# Patient Record
Sex: MALE | Race: WHITE | NOT HISPANIC OR LATINO
[De-identification: names, ages, dates, MRNs, and addresses within clinical notes are randomized per-mention and may not be internally consistent; named-entity substitution may affect disease eponyms.]

---

## 2017-06-30 ENCOUNTER — APPOINTMENT (OUTPATIENT)
Dept: UROLOGY | Facility: CLINIC | Age: 77
End: 2017-06-30

## 2017-06-30 VITALS — DIASTOLIC BLOOD PRESSURE: 92 MMHG | RESPIRATION RATE: 16 BRPM | SYSTOLIC BLOOD PRESSURE: 148 MMHG | HEART RATE: 72 BPM

## 2017-07-10 LAB — PSA SERPL-MCNC: 4.97 NG/ML

## 2017-08-02 ENCOUNTER — APPOINTMENT (OUTPATIENT)
Dept: UROLOGY | Facility: CLINIC | Age: 77
End: 2017-08-02
Payer: MEDICARE

## 2017-08-02 VITALS — BODY MASS INDEX: 28.44 KG/M2 | HEIGHT: 72 IN | WEIGHT: 210 LBS

## 2017-08-02 DIAGNOSIS — N20.2 CALCULUS OF KIDNEY WITH CALCULUS OF URETER: ICD-10-CM

## 2017-08-02 PROCEDURE — 99213 OFFICE O/P EST LOW 20 MIN: CPT

## 2017-08-03 LAB — PSA SERPL-MCNC: 4.69 NG/ML

## 2018-02-02 ENCOUNTER — APPOINTMENT (OUTPATIENT)
Dept: UROLOGY | Facility: CLINIC | Age: 78
End: 2018-02-02
Payer: MEDICARE

## 2018-02-02 ENCOUNTER — OUTPATIENT (OUTPATIENT)
Dept: OUTPATIENT SERVICES | Facility: HOSPITAL | Age: 78
LOS: 1 days | End: 2018-02-02
Payer: MEDICARE

## 2018-02-02 PROCEDURE — 76775 US EXAM ABDO BACK WALL LIM: CPT | Mod: 26

## 2018-02-02 PROCEDURE — 99213 OFFICE O/P EST LOW 20 MIN: CPT | Mod: 25

## 2018-02-02 PROCEDURE — 76775 US EXAM ABDO BACK WALL LIM: CPT

## 2018-02-06 DIAGNOSIS — R97.20 ELEVATED PROSTATE SPECIFIC ANTIGEN [PSA]: ICD-10-CM

## 2018-02-06 DIAGNOSIS — N40.1 BENIGN PROSTATIC HYPERPLASIA WITH LOWER URINARY TRACT SYMPTOMS: ICD-10-CM

## 2018-02-08 LAB — PSA SERPL-MCNC: 6.33 NG/ML

## 2018-08-03 ENCOUNTER — APPOINTMENT (OUTPATIENT)
Dept: UROLOGY | Facility: CLINIC | Age: 78
End: 2018-08-03
Payer: MEDICARE

## 2018-08-03 ENCOUNTER — OTHER (OUTPATIENT)
Age: 78
End: 2018-08-03

## 2018-08-03 VITALS
WEIGHT: 211 LBS | HEART RATE: 65 BPM | SYSTOLIC BLOOD PRESSURE: 121 MMHG | BODY MASS INDEX: 28.62 KG/M2 | DIASTOLIC BLOOD PRESSURE: 70 MMHG | RESPIRATION RATE: 16 BRPM | TEMPERATURE: 97.6 F

## 2018-08-03 PROCEDURE — 99213 OFFICE O/P EST LOW 20 MIN: CPT

## 2018-08-06 LAB — PSA SERPL-MCNC: 6.32 NG/ML

## 2018-08-23 ENCOUNTER — FORM ENCOUNTER (OUTPATIENT)
Age: 78
End: 2018-08-23

## 2018-08-24 ENCOUNTER — APPOINTMENT (OUTPATIENT)
Dept: MRI IMAGING | Facility: IMAGING CENTER | Age: 78
End: 2018-08-24
Payer: MEDICARE

## 2018-08-24 ENCOUNTER — OUTPATIENT (OUTPATIENT)
Dept: OUTPATIENT SERVICES | Facility: HOSPITAL | Age: 78
LOS: 1 days | End: 2018-08-24
Payer: MEDICARE

## 2018-08-24 DIAGNOSIS — R97.20 ELEVATED PROSTATE SPECIFIC ANTIGEN [PSA]: ICD-10-CM

## 2018-08-24 PROCEDURE — A9585: CPT

## 2018-08-24 PROCEDURE — 72197 MRI PELVIS W/O & W/DYE: CPT

## 2018-08-24 PROCEDURE — 82565 ASSAY OF CREATININE: CPT

## 2018-08-24 PROCEDURE — 72197 MRI PELVIS W/O & W/DYE: CPT | Mod: 26

## 2018-08-30 ENCOUNTER — APPOINTMENT (OUTPATIENT)
Dept: ORTHOPEDIC SURGERY | Facility: CLINIC | Age: 78
End: 2018-08-30
Payer: COMMERCIAL

## 2018-08-30 VITALS — BODY MASS INDEX: 28.44 KG/M2 | HEIGHT: 72 IN | WEIGHT: 210 LBS

## 2018-08-30 VITALS — DIASTOLIC BLOOD PRESSURE: 70 MMHG | HEART RATE: 71 BPM | SYSTOLIC BLOOD PRESSURE: 135 MMHG

## 2018-08-30 DIAGNOSIS — Z86.39 PERSONAL HISTORY OF OTHER ENDOCRINE, NUTRITIONAL AND METABOLIC DISEASE: ICD-10-CM

## 2018-08-30 DIAGNOSIS — Z78.9 OTHER SPECIFIED HEALTH STATUS: ICD-10-CM

## 2018-08-30 DIAGNOSIS — M54.2 CERVICALGIA: ICD-10-CM

## 2018-08-30 PROCEDURE — 99204 OFFICE O/P NEW MOD 45 MIN: CPT

## 2018-08-30 PROCEDURE — 72040 X-RAY EXAM NECK SPINE 2-3 VW: CPT

## 2018-09-12 ENCOUNTER — APPOINTMENT (OUTPATIENT)
Dept: UROLOGY | Facility: CLINIC | Age: 78
End: 2018-09-12
Payer: MEDICARE

## 2018-09-12 PROCEDURE — 99213 OFFICE O/P EST LOW 20 MIN: CPT

## 2018-09-26 ENCOUNTER — OUTPATIENT (OUTPATIENT)
Dept: OUTPATIENT SERVICES | Facility: HOSPITAL | Age: 78
LOS: 1 days | End: 2018-09-26
Payer: MEDICARE

## 2018-09-26 ENCOUNTER — APPOINTMENT (OUTPATIENT)
Dept: UROLOGY | Facility: CLINIC | Age: 78
End: 2018-09-26
Payer: MEDICARE

## 2018-09-26 VITALS — SYSTOLIC BLOOD PRESSURE: 154 MMHG | RESPIRATION RATE: 16 BRPM | HEART RATE: 85 BPM | DIASTOLIC BLOOD PRESSURE: 84 MMHG

## 2018-09-26 DIAGNOSIS — R35.0 FREQUENCY OF MICTURITION: ICD-10-CM

## 2018-09-26 DIAGNOSIS — R97.20 ELEVATED PROSTATE, SPECIFIC ANTIGEN [PSA]: ICD-10-CM

## 2018-09-26 PROCEDURE — 76942 ECHO GUIDE FOR BIOPSY: CPT | Mod: 26,59

## 2018-09-26 PROCEDURE — 76872 US TRANSRECTAL: CPT | Mod: 26

## 2018-09-26 PROCEDURE — 55700: CPT

## 2018-09-26 PROCEDURE — 88305 TISSUE EXAM BY PATHOLOGIST: CPT | Mod: 26

## 2018-09-26 PROCEDURE — 76872 US TRANSRECTAL: CPT

## 2018-09-26 PROCEDURE — 76942 ECHO GUIDE FOR BIOPSY: CPT | Mod: 59

## 2018-09-29 DIAGNOSIS — R97.20 ELEVATED PROSTATE SPECIFIC ANTIGEN [PSA]: ICD-10-CM

## 2018-10-03 ENCOUNTER — APPOINTMENT (OUTPATIENT)
Dept: UROLOGY | Facility: CLINIC | Age: 78
End: 2018-10-03
Payer: MEDICARE

## 2018-10-03 PROCEDURE — 99213 OFFICE O/P EST LOW 20 MIN: CPT

## 2018-10-04 LAB — CORE LAB BIOPSY: NORMAL

## 2018-10-09 ENCOUNTER — OUTPATIENT (OUTPATIENT)
Dept: OUTPATIENT SERVICES | Facility: HOSPITAL | Age: 78
LOS: 1 days | Discharge: HOME | End: 2018-10-09
Payer: MEDICARE

## 2018-10-09 ENCOUNTER — APPOINTMENT (OUTPATIENT)
Dept: RADIATION ONCOLOGY | Facility: CLINIC | Age: 78
End: 2018-10-09
Payer: MEDICARE

## 2018-10-09 VITALS — HEIGHT: 72 IN | WEIGHT: 210 LBS | BODY MASS INDEX: 28.44 KG/M2 | RESPIRATION RATE: 16 BRPM

## 2018-10-09 DIAGNOSIS — Z87.891 PERSONAL HISTORY OF NICOTINE DEPENDENCE: ICD-10-CM

## 2018-10-09 PROCEDURE — 77263 THER RADIOLOGY TX PLNG CPLX: CPT

## 2018-10-09 PROCEDURE — 99205 OFFICE O/P NEW HI 60 MIN: CPT | Mod: 25

## 2018-10-09 RX ORDER — CIPROFLOXACIN HYDROCHLORIDE 500 MG/1
500 TABLET, FILM COATED ORAL TWICE DAILY
Qty: 6 | Refills: 0 | Status: DISCONTINUED | COMMUNITY
Start: 2018-09-12 | End: 2018-10-09

## 2018-10-17 ENCOUNTER — APPOINTMENT (OUTPATIENT)
Dept: UROLOGY | Facility: CLINIC | Age: 78
End: 2018-10-17

## 2018-10-19 ENCOUNTER — OUTPATIENT (OUTPATIENT)
Dept: OUTPATIENT SERVICES | Facility: HOSPITAL | Age: 78
LOS: 1 days | End: 2018-10-19
Payer: MEDICARE

## 2018-10-19 ENCOUNTER — APPOINTMENT (OUTPATIENT)
Dept: UROLOGY | Facility: CLINIC | Age: 78
End: 2018-10-19
Payer: MEDICARE

## 2018-10-19 DIAGNOSIS — R35.0 FREQUENCY OF MICTURITION: ICD-10-CM

## 2018-10-19 PROCEDURE — 99213 OFFICE O/P EST LOW 20 MIN: CPT

## 2018-10-19 PROCEDURE — 96402 CHEMO HORMON ANTINEOPL SQ/IM: CPT

## 2018-10-19 RX ORDER — LEUPROLIDE ACETATE 45 MG
45 KIT INTRAMUSCULAR
Qty: 1 | Refills: 0 | Status: DISCONTINUED | OUTPATIENT
Start: 2018-10-19 | End: 2018-10-19

## 2018-10-19 RX ORDER — LEUPROLIDE ACETATE 45 MG
45 KIT INTRAMUSCULAR
Qty: 1 | Refills: 0 | Status: COMPLETED | OUTPATIENT
Start: 2018-10-19

## 2018-10-19 RX ADMIN — LEUPROLIDE ACETATE 0 MG: KIT at 00:00

## 2018-10-24 DIAGNOSIS — C61 MALIGNANT NEOPLASM OF PROSTATE: ICD-10-CM

## 2018-10-25 PROCEDURE — 77301 RADIOTHERAPY DOSE PLAN IMRT: CPT | Mod: 26

## 2018-10-25 PROCEDURE — 77300 RADIATION THERAPY DOSE PLAN: CPT | Mod: 26

## 2018-10-25 PROCEDURE — 77338 DESIGN MLC DEVICE FOR IMRT: CPT | Mod: 26

## 2018-11-19 PROCEDURE — 77387B: CUSTOM | Mod: 26

## 2018-11-19 PROCEDURE — 77427 RADIATION TX MANAGEMENT X5: CPT

## 2018-11-20 PROCEDURE — 77387B: CUSTOM | Mod: 26

## 2018-11-21 PROCEDURE — 77387B: CUSTOM | Mod: 26

## 2018-11-23 ENCOUNTER — LABORATORY RESULT (OUTPATIENT)
Age: 78
End: 2018-11-23

## 2018-11-23 VITALS
HEART RATE: 64 BPM | HEIGHT: 72 IN | OXYGEN SATURATION: 99 % | RESPIRATION RATE: 16 BRPM | WEIGHT: 210 LBS | BODY MASS INDEX: 28.44 KG/M2 | SYSTOLIC BLOOD PRESSURE: 129 MMHG | DIASTOLIC BLOOD PRESSURE: 83 MMHG

## 2018-11-23 PROCEDURE — 77387B: CUSTOM | Mod: 26

## 2018-11-23 NOTE — REVIEW OF SYSTEMS
[Anal Pain: Grade 0] : Anal Pain: Grade 0 [Constipation: Grade 0] : Constipation: Grade 0 [Diarrhea: Grade 0] : Diarrhea: Grade 0 [Dyspepsia: Grade 0] : Dyspepsia: Grade 0 [Dysphagia: Grade 0] : Dysphagia: Grade 0 [Esophagitis: Grade 0] : Esophagitis: Grade 0 [Fecal Incontinence: Grade 0] : Fecal Incontinence: Grade 0 [Gastroparesis: Grade 0] : Gastroparesis: Grade 0 [Nausea: Grade 0] : Nausea: Grade 0 [Proctitis: Grade 0] : Proctitis: Grade 0 [Rectal Pain: Grade 0] : Rectal Pain: Grade 0 [Small Intestinal Obstruction: Grade 0] : Small Intestinal Obstruction: Grade 0 [Vomiting: Grade 0] : Vomiting: Grade 0 [Hematuria: Grade 0] : Hematuria: Grade 0 [Urinary Incontinence: Grade 0] : Urinary Incontinence: Grade 0  [Urinary Retention: Grade 0] : Urinary Retention: Grade 0 [Urinary Tract Pain: Grade 0] : Urinary Tract Pain: Grade 0 [Urinary Urgency: Grade 0] : Urinary Urgency: Grade 0 [Urinary Frequency: Grade 0] : Urinary Frequency: Grade 0

## 2018-11-25 LAB
APPEARANCE: CLEAR
BILIRUBIN URINE: NEGATIVE
BLOOD URINE: ABNORMAL
COLOR: YELLOW
GLUCOSE QUALITATIVE U: NEGATIVE MG/DL
KETONES URINE: NEGATIVE
LEUKOCYTE ESTERASE URINE: ABNORMAL
NITRITE URINE: NEGATIVE
PH URINE: 5
PROTEIN URINE: NEGATIVE MG/DL
SPECIFIC GRAVITY URINE: 1.01
UROBILINOGEN URINE: NEGATIVE MG/DL

## 2018-11-25 NOTE — HISTORY OF PRESENT ILLNESS
[FreeTextEntry1] : 76 y/o male with h/o DM2, newly diagnosed prostate cancer presents here for consideration of radiation treatment. IPSS score 10, EPIC 8. On Alfuzosin for urinary retention, frequency. \par \par Prostate biopsy in 9/2018 revealed [place] 4+3 in one core with 20% of tissue involved, [place] 3+4 in one core with 10% involved. Total 19 cores. MRI in 8/2018 showed prostate vol 99cc., score 5. PSA 6.32 in 8/2018.\par \par 3/28 fractions completed. c/o low abd pain, maybe blood in urine. Will check UA , then give antibiotics

## 2018-11-28 ENCOUNTER — APPOINTMENT (OUTPATIENT)
Dept: RADIATION ONCOLOGY | Facility: CLINIC | Age: 78
End: 2018-11-28
Payer: MEDICARE

## 2018-11-28 PROCEDURE — ZZZZZ: CPT

## 2018-11-28 RX ORDER — PHENAZOPYRIDINE 200 MG/1
200 TABLET, FILM COATED ORAL 3 TIMES DAILY
Qty: 30 | Refills: 1 | Status: ACTIVE | COMMUNITY
Start: 2018-11-28 | End: 1900-01-01

## 2018-11-28 NOTE — REVIEW OF SYSTEMS
[Anal Pain: Grade 1 - Mild pain] : Anal Pain: Grade 1 - Mild pain [Constipation: Grade 0] : Constipation: Grade 0 [Diarrhea: Grade 1 - Increase of <4 stools per day over baseline; mild increase in ostomy output compared to baseline] : Diarrhea: Grade 1 - Increase of <4 stools per day over baseline; mild increase in ostomy output compared to baseline [Dyspepsia: Grade 0] : Dyspepsia: Grade 0 [Dysphagia: Grade 0] : Dysphagia: Grade 0 [Esophagitis: Grade 0] : Esophagitis: Grade 0 [Fecal Incontinence: Grade 0] : Fecal Incontinence: Grade 0 [Gastroparesis: Grade 0] : Gastroparesis: Grade 0 [Nausea: Grade 0] : Nausea: Grade 0 [Proctitis: Grade 0] : Proctitis: Grade 0 [Rectal Pain: Grade 0] : Rectal Pain: Grade 0 [Small Intestinal Obstruction: Grade 0] : Small Intestinal Obstruction: Grade 0 [Vomiting: Grade 0] : Vomiting: Grade 0 [Hematuria: Grade 0] : Hematuria: Grade 0 [Urinary Incontinence: Grade 1 - Occasional (e.g., with coughing, sneezing, etc.), pads not indicated] : Urinary Incontinence: Grade 1 - Occasional (e.g., with coughing, sneezing, etc.), pads not indicated [Urinary Retention: Grade 1 - Urinary, suprapubic or intermittent catheter placement not indicated; able to void with some residual] : Urinary Retention: Grade 1 - Urinary, suprapubic or intermittent catheter placement not indicated; able to void with some residual [Urinary Tract Pain: Grade 2 - Moderate pain; limiting instrumental ADL] : Urinary Tract Pain: Grade 2 - Moderate pain; limiting instrumental ADL [Urinary Urgency: Grade 0] : Urinary Urgency: Grade 0 [Urinary Frequency: Grade 2 - Limiting instrumental ADL; medical management indicated] : Urinary Frequency: Grade 2 - Limiting instrumental ADL; medical management indicated [Erectile Dysfunction: Grade 3 - Decrease in erectile function (frequency/rigidity of erections) but erectile intervention not helpful (e.g., medication or mechanical devices such as penile pump); placement of a permanent penile prosthesis indicated (not p] : Erectile Dysfunction: Grade 3 - Decrease in erectile function (frequency/rigidity of erections) but erectile intervention not helpful (e.g., medication or mechanical devices such as penile pump); placement of a permanent penile prosthesis indicated (not previously present) [Ejaculation Disorder: Grade 2 - Anejaculation or retrograde ejaculation] : Ejaculation Disorder: Grade 2 - Anejaculation or retrograde ejaculation

## 2018-12-02 NOTE — HISTORY OF PRESENT ILLNESS
[FreeTextEntry1] : 76 y/o male with h/o DM2, newly diagnosed prostate cancer presents here for consideration of radiation treatment. IPSS score 10, EPIC 8. On Alfuzosin for urinary retention, frequency. \par \par Prostate biopsy in 9/2018 revealed [place] 4+3 in one core with 20% of tissue involved, [place] 3+4 in one core with 10% involved. Total 19 cores. MRI in 8/2018 showed prostate vol 99cc., score 5. PSA 6.32 in 8/2018.\par \par 3/28 fractions completed. c/o low abd pain, maybe blood in urine. Urine test few days ago showed blood in urine. culture negative. On Bactrim for 3 days, but urinary bother has not been getting better.  IPSS 35, EPIC 30. Will give some meds for overactive bladder and painful urination. Pt stated he had a bad bleeding from prostate biopsy.

## 2018-12-05 PROCEDURE — 77387B: CUSTOM | Mod: 26

## 2018-12-06 PROCEDURE — 77014: CPT | Mod: 26

## 2018-12-07 PROCEDURE — 77387B: CUSTOM | Mod: 26

## 2018-12-07 PROCEDURE — 77427 RADIATION TX MANAGEMENT X5: CPT

## 2018-12-10 PROCEDURE — 77387B: CUSTOM | Mod: 26

## 2018-12-11 PROCEDURE — 77387B: CUSTOM | Mod: 26

## 2018-12-13 VITALS
SYSTOLIC BLOOD PRESSURE: 135 MMHG | DIASTOLIC BLOOD PRESSURE: 81 MMHG | RESPIRATION RATE: 16 BRPM | OXYGEN SATURATION: 95 % | HEIGHT: 72 IN | HEART RATE: 59 BPM | BODY MASS INDEX: 28.71 KG/M2 | WEIGHT: 212 LBS

## 2018-12-13 PROCEDURE — 77387B: CUSTOM | Mod: 26

## 2018-12-13 RX ORDER — SULFAMETHOXAZOLE AND TRIMETHOPRIM 800; 160 MG/1; MG/1
800-160 TABLET ORAL
Qty: 6 | Refills: 0 | Status: DISCONTINUED | COMMUNITY
Start: 2018-11-23 | End: 2018-12-13

## 2018-12-14 PROCEDURE — 77014: CPT | Mod: 26

## 2018-12-14 PROCEDURE — 77427 RADIATION TX MANAGEMENT X5: CPT

## 2018-12-17 PROCEDURE — 77387B: CUSTOM | Mod: 26

## 2018-12-17 NOTE — HISTORY OF PRESENT ILLNESS
[FreeTextEntry1] : 78 y/o male with h/o DM2, newly diagnosed prostate cancer presents here for consideration of radiation treatment. IPSS score 10, EPIC 8. On Alfuzosin for urinary retention, frequency. \par \par Prostate biopsy in 9/2018 revealed [place] 4+3 in one core with 20% of tissue involved, [place] 3+4 in one core with 10% involved. Total 19 cores. MRI in 8/2018 showed prostate vol 99cc., score 5. PSA 6.32 in 8/2018.\par \par 9/28 fractions completed. Burning urination is getting better, just stopped pyridium. c/o frequency getting better while on Alfuzosin. Advised pt to continue Alfuzosin given from urologist

## 2018-12-18 PROCEDURE — 77387B: CUSTOM | Mod: 26

## 2018-12-19 PROCEDURE — 77387B: CUSTOM | Mod: 26

## 2018-12-20 PROCEDURE — 77387B: CUSTOM | Mod: 26

## 2018-12-20 NOTE — REVIEW OF SYSTEMS
[Anal Pain: Grade 1 - Mild pain] : Anal Pain: Grade 1 - Mild pain [Constipation: Grade 0] : Constipation: Grade 0 [Diarrhea: Grade 1 - Increase of <4 stools per day over baseline; mild increase in ostomy output compared to baseline] : Diarrhea: Grade 1 - Increase of <4 stools per day over baseline; mild increase in ostomy output compared to baseline [Dyspepsia: Grade 0] : Dyspepsia: Grade 0 [Dysphagia: Grade 0] : Dysphagia: Grade 0 [Esophagitis: Grade 0] : Esophagitis: Grade 0 [Fecal Incontinence: Grade 0] : Fecal Incontinence: Grade 0 [Gastroparesis: Grade 0] : Gastroparesis: Grade 0 [Nausea: Grade 0] : Nausea: Grade 0 [Proctitis: Grade 0] : Proctitis: Grade 0 [Rectal Pain: Grade 0] : Rectal Pain: Grade 0 [Small Intestinal Obstruction: Grade 0] : Small Intestinal Obstruction: Grade 0 [Vomiting: Grade 0] : Vomiting: Grade 0 [Hematuria: Grade 0] : Hematuria: Grade 0 [Urinary Incontinence: Grade 0] : Urinary Incontinence: Grade 0  [Urinary Retention: Grade 0] : Urinary Retention: Grade 0 [Urinary Tract Pain: Grade 1 - Mild pain] : Urinary Tract Pain: Grade 1 - Mild pain [Urinary Urgency: Grade 0] : Urinary Urgency: Grade 0 [Urinary Frequency: Grade 2 - Limiting instrumental ADL; medical management indicated] : Urinary Frequency: Grade 2 - Limiting instrumental ADL; medical management indicated [Erectile Dysfunction: Grade 3 - Decrease in erectile function (frequency/rigidity of erections) but erectile intervention not helpful (e.g., medication or mechanical devices such as penile pump); placement of a permanent penile prosthesis indicated (not p] : Erectile Dysfunction: Grade 3 - Decrease in erectile function (frequency/rigidity of erections) but erectile intervention not helpful (e.g., medication or mechanical devices such as penile pump); placement of a permanent penile prosthesis indicated (not previously present) [Ejaculation Disorder: Grade 2 - Anejaculation or retrograde ejaculation] : Ejaculation Disorder: Grade 2 - Anejaculation or retrograde ejaculation

## 2018-12-21 PROCEDURE — 77427 RADIATION TX MANAGEMENT X5: CPT

## 2018-12-21 PROCEDURE — 77014: CPT | Mod: 26

## 2018-12-23 NOTE — HISTORY OF PRESENT ILLNESS
[FreeTextEntry1] : 78 y/o male with h/o DM2, newly diagnosed prostate cancer presents here for consideration of radiation treatment. IPSS score 10, EPIC 8. On Alfuzosin for urinary retention, frequency. \par \par Prostate biopsy in 9/2018 revealed [place] 4+3 in one core with 20% of tissue involved, [place] 3+4 in one core with 10% involved. Total 19 cores. MRI in 8/2018 showed prostate vol 99cc., score 5. PSA 6.32 in 8/2018.\par \par 14/28 fractions completed. Burning urination is getting much better, just stopped pyridium. c/o frequency getting better while on Alfuzosin. Advised pt to continue Alfuzosin given from urologist.

## 2018-12-24 PROCEDURE — 77387B: CUSTOM | Mod: 26

## 2018-12-26 PROCEDURE — 77387B: CUSTOM | Mod: 26

## 2018-12-26 NOTE — REVIEW OF SYSTEMS
[Anal Pain: Grade 1 - Mild pain] : Anal Pain: Grade 1 - Mild pain [Constipation: Grade 0] : Constipation: Grade 0 [Diarrhea: Grade 1 - Increase of <4 stools per day over baseline; mild increase in ostomy output compared to baseline] : Diarrhea: Grade 1 - Increase of <4 stools per day over baseline; mild increase in ostomy output compared to baseline [Dyspepsia: Grade 0] : Dyspepsia: Grade 0 [Dysphagia: Grade 0] : Dysphagia: Grade 0 [Esophagitis: Grade 0] : Esophagitis: Grade 0 [Fecal Incontinence: Grade 0] : Fecal Incontinence: Grade 0 [Gastroparesis: Grade 0] : Gastroparesis: Grade 0 [Nausea: Grade 0] : Nausea: Grade 0 [Proctitis: Grade 0] : Proctitis: Grade 0 [Rectal Pain: Grade 0] : Rectal Pain: Grade 0 [Small Intestinal Obstruction: Grade 0] : Small Intestinal Obstruction: Grade 0 [Vomiting: Grade 0] : Vomiting: Grade 0 [Hematuria: Grade 0] : Hematuria: Grade 0 [Urinary Incontinence: Grade 0] : Urinary Incontinence: Grade 0  [Urinary Retention: Grade 0] : Urinary Retention: Grade 0 [Urinary Tract Pain: Grade 0] : Urinary Tract Pain: Grade 0 [Urinary Urgency: Grade 0] : Urinary Urgency: Grade 0 [Urinary Frequency: Grade 1 - Present] : Urinary Frequency: Grade 1 - Present

## 2018-12-27 PROCEDURE — 77387B: CUSTOM | Mod: 26

## 2018-12-28 PROCEDURE — 77014: CPT | Mod: 26

## 2018-12-31 PROCEDURE — 77387B: CUSTOM | Mod: 26

## 2019-01-02 PROCEDURE — 77427 RADIATION TX MANAGEMENT X5: CPT

## 2019-01-02 PROCEDURE — 77387B: CUSTOM | Mod: 26

## 2019-01-02 NOTE — REVIEW OF SYSTEMS
[Anal Pain: Grade 1 - Mild pain] : Anal Pain: Grade 1 - Mild pain [Constipation: Grade 0] : Constipation: Grade 0 [Diarrhea: Grade 0] : Diarrhea: Grade 0 [Dyspepsia: Grade 0] : Dyspepsia: Grade 0 [Dysphagia: Grade 0] : Dysphagia: Grade 0 [Esophagitis: Grade 0] : Esophagitis: Grade 0 [Fecal Incontinence: Grade 0] : Fecal Incontinence: Grade 0 [Gastroparesis: Grade 0] : Gastroparesis: Grade 0 [Nausea: Grade 0] : Nausea: Grade 0 [Proctitis: Grade 0] : Proctitis: Grade 0 [Rectal Pain: Grade 0] : Rectal Pain: Grade 0 [Small Intestinal Obstruction: Grade 0] : Small Intestinal Obstruction: Grade 0 [Vomiting: Grade 0] : Vomiting: Grade 0 [Hematuria: Grade 0] : Hematuria: Grade 0 [Urinary Incontinence: Grade 0] : Urinary Incontinence: Grade 0  [Urinary Retention: Grade 0] : Urinary Retention: Grade 0 [Urinary Tract Pain: Grade 0] : Urinary Tract Pain: Grade 0 [Urinary Urgency: Grade 0] : Urinary Urgency: Grade 0 [Urinary Frequency: Grade 1 - Present] : Urinary Frequency: Grade 1 - Present

## 2019-01-03 PROCEDURE — 77387B: CUSTOM | Mod: 26

## 2019-01-07 PROCEDURE — 77014: CPT | Mod: 26

## 2019-01-07 NOTE — HISTORY OF PRESENT ILLNESS
[FreeTextEntry1] : 78 y/o male with h/o DM2, newly diagnosed prostate cancer presents here for consideration of radiation treatment. IPSS score 10, EPIC 8. On Alfuzosin for urinary retention, frequency. \par \par Prostate biopsy in 9/2018 revealed [place] 4+3 in one core with 20% of tissue involved, [place] 3+4 in one core with 10% involved. Total 19 cores. MRI in 8/2018 showed prostate vol 99cc., score 5. PSA 6.32 in 8/2018.\par \par 17/28 fractions completed; stopped Pyridium last week.  Frequency stable while on Alfuzosin, he is getting from his urologist.

## 2019-01-08 PROCEDURE — 77387B: CUSTOM | Mod: 26

## 2019-01-09 PROCEDURE — 77387B: CUSTOM | Mod: 26

## 2019-01-10 PROCEDURE — 77387B: CUSTOM | Mod: 26

## 2019-01-11 PROCEDURE — 77387B: CUSTOM | Mod: 26

## 2019-01-14 PROCEDURE — 77387B: CUSTOM | Mod: 26

## 2019-02-03 NOTE — HISTORY OF PRESENT ILLNESS
[FreeTextEntry1] : 76 y/o male with h/o DM2, newly diagnosed prostate cancer presents here for consideration of radiation treatment. IPSS score 10, EPIC 8. On Alfuzosin for urinary retention, frequency. \par \par Prostate biopsy in 9/2018 revealed [place] 4+3 in one core with 20% of tissue involved, [place] 3+4 in one core with 10% involved. Total 19 cores. MRI in 8/2018 showed prostate vol 99cc., score 5. PSA 6.32 in 8/2018.\par \par 21/28 fractions completed; Urinary frequency stable while on Alfuzosin which he is getting from his urologist. No hematuria, abd pain, fever, burning urination

## 2019-02-21 ENCOUNTER — APPOINTMENT (OUTPATIENT)
Dept: RADIATION ONCOLOGY | Facility: CLINIC | Age: 79
End: 2019-02-21
Payer: MEDICARE

## 2019-02-21 VITALS
WEIGHT: 222.33 LBS | RESPIRATION RATE: 16 BRPM | OXYGEN SATURATION: 94 % | SYSTOLIC BLOOD PRESSURE: 151 MMHG | DIASTOLIC BLOOD PRESSURE: 80 MMHG | HEART RATE: 67 BPM | BODY MASS INDEX: 30.15 KG/M2

## 2019-02-21 PROCEDURE — 99024 POSTOP FOLLOW-UP VISIT: CPT | Mod: GC

## 2019-02-21 NOTE — PHYSICAL EXAM
[] : no respiratory distress [Auscultation Breath Sounds / Voice Sounds] : lungs were clear to auscultation bilaterally [Bowel Sounds] : normal bowel sounds [Abdomen Soft] : soft [Abdomen Tenderness] : non-tender [Normal] : no focal deficits

## 2019-02-21 NOTE — REVIEW OF SYSTEMS
[Diarrhea: Grade 0] : Diarrhea: Grade 0 [Nausea: Grade 0] : Nausea: Grade 0 [Proctitis: Grade 0] : Proctitis: Grade 0 [Hematuria: Grade 0] : Hematuria: Grade 0 [Urinary Incontinence: Grade 0] : Urinary Incontinence: Grade 0  [Urinary Retention: Grade 1 - Urinary, suprapubic or intermittent catheter placement not indicated; able to void with some residual] : Urinary Retention: Grade 1 - Urinary, suprapubic or intermittent catheter placement not indicated; able to void with some residual [Urinary Tract Pain: Grade 0] : Urinary Tract Pain: Grade 0 [Urinary Urgency: Grade 0] : Urinary Urgency: Grade 0 [Urinary Frequency: Grade 1 - Present] : Urinary Frequency: Grade 1 - Present

## 2019-02-22 NOTE — HISTORY OF PRESENT ILLNESS
[FreeTextEntry1] : 78 year old man with [id number] adenocarcinoma of the prostate, [place] 4+3, pre treatment PSA 6.32 ng/mL. He is s/p hypofractionated radiation to 70 Gy in 28 fractions completed on 1/14/19 with Lupron (1st 10/2018). He presents for post treatment evaluation. \par \par He feels well without significant urinary issues. No diarrhea. He is on alfuzosin 1 tab PO qHS.

## 2019-02-22 NOTE — DISEASE MANAGEMENT
[1] : T1 [c] : c [0] : M0 [0-10] : 0 -10 ng/mL [7(4+3)] : Sonya Score 7(4+3) [IIC] : IIC [Radiation Therapy] : Radiation Therapy [MeasuredProstateVolume] : 99

## 2019-03-01 ENCOUNTER — RX RENEWAL (OUTPATIENT)
Age: 79
End: 2019-03-01

## 2019-03-05 ENCOUNTER — APPOINTMENT (OUTPATIENT)
Dept: DERMATOLOGY | Facility: CLINIC | Age: 79
End: 2019-03-05
Payer: MEDICARE

## 2019-03-05 VITALS
HEIGHT: 72 IN | SYSTOLIC BLOOD PRESSURE: 118 MMHG | WEIGHT: 220 LBS | BODY MASS INDEX: 29.8 KG/M2 | DIASTOLIC BLOOD PRESSURE: 64 MMHG

## 2019-03-05 DIAGNOSIS — Z86.010 PERSONAL HISTORY OF COLONIC POLYPS: ICD-10-CM

## 2019-03-05 PROCEDURE — 99204 OFFICE O/P NEW MOD 45 MIN: CPT

## 2019-03-19 LAB
ALBUMIN SERPL ELPH-MCNC: 4.3 G/DL
ALP BLD-CCNC: 68 U/L
ALT SERPL-CCNC: 28 U/L
ANION GAP SERPL CALC-SCNC: 14 MMOL/L
AST SERPL-CCNC: 25 U/L
BASOPHILS # BLD AUTO: 0.05 K/UL
BASOPHILS NFR BLD AUTO: 0.7 %
BILIRUB SERPL-MCNC: 0.9 MG/DL
BUN SERPL-MCNC: 16 MG/DL
CALCIUM SERPL-MCNC: 9.8 MG/DL
CHLORIDE SERPL-SCNC: 102 MMOL/L
CO2 SERPL-SCNC: 23 MMOL/L
CREAT SERPL-MCNC: 0.95 MG/DL
EOSINOPHIL # BLD AUTO: 0.63 K/UL
EOSINOPHIL NFR BLD AUTO: 8.4 %
GLUCOSE SERPL-MCNC: 117 MG/DL
HBV CORE IGG+IGM SER QL: NONREACTIVE
HBV CORE IGM SER QL: NONREACTIVE
HBV SURFACE AB SER QL: NONREACTIVE
HBV SURFACE AG SER QL: NONREACTIVE
HCT VFR BLD CALC: 46.7 %
HCV AB SER QL: NONREACTIVE
HCV S/CO RATIO: 0.13 S/CO
HGB BLD-MCNC: 15.7 G/DL
IMM GRANULOCYTES NFR BLD AUTO: 0.5 %
LYMPHOCYTES # BLD AUTO: 0.91 K/UL
LYMPHOCYTES NFR BLD AUTO: 12.2 %
MAN DIFF?: NORMAL
MCHC RBC-ENTMCNC: 30.9 PG
MCHC RBC-ENTMCNC: 33.6 GM/DL
MCV RBC AUTO: 91.9 FL
MONOCYTES # BLD AUTO: 0.72 K/UL
MONOCYTES NFR BLD AUTO: 9.6 %
NEUTROPHILS # BLD AUTO: 5.12 K/UL
NEUTROPHILS NFR BLD AUTO: 68.6 %
PLATELET # BLD AUTO: 200 K/UL
POTASSIUM SERPL-SCNC: 4.4 MMOL/L
PROT SERPL-MCNC: 6.9 G/DL
RBC # BLD: 5.08 M/UL
RBC # FLD: 14.5 %
SODIUM SERPL-SCNC: 139 MMOL/L
WBC # FLD AUTO: 7.47 K/UL

## 2019-04-16 ENCOUNTER — APPOINTMENT (OUTPATIENT)
Dept: DERMATOLOGY | Facility: CLINIC | Age: 79
End: 2019-04-16
Payer: MEDICARE

## 2019-04-16 PROCEDURE — 99214 OFFICE O/P EST MOD 30 MIN: CPT

## 2019-05-03 ENCOUNTER — APPOINTMENT (OUTPATIENT)
Dept: RADIATION ONCOLOGY | Facility: CLINIC | Age: 79
End: 2019-05-03
Payer: MEDICARE

## 2019-05-03 VITALS
OXYGEN SATURATION: 97 % | RESPIRATION RATE: 16 BRPM | BODY MASS INDEX: 28.16 KG/M2 | SYSTOLIC BLOOD PRESSURE: 133 MMHG | WEIGHT: 207.87 LBS | TEMPERATURE: 98.2 F | DIASTOLIC BLOOD PRESSURE: 82 MMHG | HEART RATE: 89 BPM | HEIGHT: 72 IN

## 2019-05-03 PROCEDURE — 99214 OFFICE O/P EST MOD 30 MIN: CPT | Mod: GC

## 2019-05-03 NOTE — PHYSICAL EXAM
[Normal] : normal heart rate and rhythm, normal S1 and S2, and no murmurs present [No Focal Deficits] : no focal deficits

## 2019-05-06 LAB — PSA SERPL-MCNC: <0.01 NG/ML

## 2019-05-06 NOTE — DISEASE MANAGEMENT
[IIC] : IIC [1] : T1 [c] : c [0] : M0 [0-10] : 0 -10 ng/mL [7(4+3)] : Sonya Score 7(4+3) [Radiation Therapy] : Radiation Therapy [NTNM] : 0 [Clinical] : TNM Stage: c [TTNM] : 2 [MeasuredProstateVolume] : 99 [MTNM] : 0

## 2019-05-06 NOTE — HISTORY OF PRESENT ILLNESS
[FreeTextEntry1] : 78 year old man with [id number] adenocarcinoma of the prostate, [place] 4+3, pre treatment PSA 6.32 ng/mL. He is s/p hypofractionated radiation to 70 Gy in 28 fractions completed on 1/14/19 with Lupron (1st 10/2018). He presents for F/U \par \par He feels well without significant urinary issues. No diarrhea. He is on alfuzosin 1 tab PO qHS. Advised to follow up with urologist too

## 2019-06-25 LAB — PSA SERPL-MCNC: <0.01 NG/ML

## 2019-07-02 ENCOUNTER — APPOINTMENT (OUTPATIENT)
Dept: DERMATOLOGY | Facility: CLINIC | Age: 79
End: 2019-07-02
Payer: MEDICARE

## 2019-07-02 LAB
ALBUMIN SERPL ELPH-MCNC: 4.3 G/DL
ALP BLD-CCNC: 66 U/L
ALT SERPL-CCNC: 14 U/L
ANION GAP SERPL CALC-SCNC: 16 MMOL/L
AST SERPL-CCNC: 14 U/L
BASOPHILS # BLD AUTO: 0.03 K/UL
BASOPHILS NFR BLD AUTO: 0.5 %
BILIRUB SERPL-MCNC: 0.5 MG/DL
BUN SERPL-MCNC: 15 MG/DL
CALCIUM SERPL-MCNC: 9.6 MG/DL
CHLORIDE SERPL-SCNC: 109 MMOL/L
CO2 SERPL-SCNC: 18 MMOL/L
CREAT SERPL-MCNC: 0.93 MG/DL
EOSINOPHIL # BLD AUTO: 0.23 K/UL
EOSINOPHIL NFR BLD AUTO: 4.1 %
GLUCOSE SERPL-MCNC: 125 MG/DL
HCT VFR BLD CALC: 43.8 %
HGB BLD-MCNC: 14.2 G/DL
IMM GRANULOCYTES NFR BLD AUTO: 0.7 %
LYMPHOCYTES # BLD AUTO: 0.78 K/UL
LYMPHOCYTES NFR BLD AUTO: 13.8 %
MAN DIFF?: NORMAL
MCHC RBC-ENTMCNC: 30.5 PG
MCHC RBC-ENTMCNC: 32.4 GM/DL
MCV RBC AUTO: 94 FL
MONOCYTES # BLD AUTO: 0.59 K/UL
MONOCYTES NFR BLD AUTO: 10.4 %
NEUTROPHILS # BLD AUTO: 3.98 K/UL
NEUTROPHILS NFR BLD AUTO: 70.5 %
PLATELET # BLD AUTO: 185 K/UL
POTASSIUM SERPL-SCNC: 4.1 MMOL/L
PROT SERPL-MCNC: 6.6 G/DL
RBC # BLD: 4.66 M/UL
RBC # FLD: 15.1 %
SODIUM SERPL-SCNC: 143 MMOL/L
WBC # FLD AUTO: 5.65 K/UL

## 2019-07-02 PROCEDURE — 99214 OFFICE O/P EST MOD 30 MIN: CPT

## 2019-09-11 ENCOUNTER — APPOINTMENT (OUTPATIENT)
Dept: UROLOGY | Facility: CLINIC | Age: 79
End: 2019-09-11
Payer: MEDICARE

## 2019-09-11 PROCEDURE — 99213 OFFICE O/P EST LOW 20 MIN: CPT

## 2019-09-11 NOTE — ASSESSMENT
[FreeTextEntry1] : This pt has had one hormonal shot of Lupron, followed by a full course of radiation therapy.\par This PSA in July 2019 was 0.01\par RTO in 6 months

## 2019-09-11 NOTE — PHYSICAL EXAM
[General Appearance - Well Developed] : well developed [General Appearance - Well Nourished] : well nourished [Normal Appearance] : normal appearance [Well Groomed] : well groomed [Abdomen Soft] : soft [General Appearance - In No Acute Distress] : no acute distress [Abdomen Tenderness] : non-tender [Costovertebral Angle Tenderness] : no ~M costovertebral angle tenderness [Prostate Size ___ gm] : prostate size [unfilled] gm [Edema] : no peripheral edema [Respiration, Rhythm And Depth] : normal respiratory rhythm and effort [] : no respiratory distress [Exaggerated Use Of Accessory Muscles For Inspiration] : no accessory muscle use [Oriented To Time, Place, And Person] : oriented to person, place, and time [Mood] : the mood was normal [Affect] : the affect was normal [Not Anxious] : not anxious [Normal Station and Gait] : the gait and station were normal for the patient's age [No Focal Deficits] : no focal deficits [No Palpable Adenopathy] : no palpable adenopathy [FreeTextEntry1] : smooth and regular

## 2019-09-11 NOTE — HISTORY OF PRESENT ILLNESS
[FreeTextEntry1] : 77 year old male s/p colonoscopy presents for CaP f/u.\par Nocturia x2-3 with good stream and no leakage\par Urine is clear at night\par This pt has had one hormonal shot of Lupron, followed by a full course of radiation therapy.\par This PSA in July 2019 was 0.01\par RTO in 6 months

## 2019-09-11 NOTE — ADDENDUM
[FreeTextEntry1] :  I, Adela Bah, acted solely as a scribe for Dr. Hunter Laird. The documentation recorded by the scribe accurately reflects the service I personally performed and the decision by me.

## 2019-10-08 ENCOUNTER — APPOINTMENT (OUTPATIENT)
Dept: DERMATOLOGY | Facility: CLINIC | Age: 79
End: 2019-10-08

## 2019-11-05 ENCOUNTER — APPOINTMENT (OUTPATIENT)
Dept: RADIATION ONCOLOGY | Facility: CLINIC | Age: 79
End: 2019-11-05
Payer: MEDICARE

## 2019-11-05 VITALS
OXYGEN SATURATION: 96 % | TEMPERATURE: 97.3 F | BODY MASS INDEX: 30.6 KG/M2 | HEART RATE: 65 BPM | RESPIRATION RATE: 18 BRPM | DIASTOLIC BLOOD PRESSURE: 71 MMHG | SYSTOLIC BLOOD PRESSURE: 127 MMHG | WEIGHT: 225.64 LBS

## 2019-11-05 PROCEDURE — 99214 OFFICE O/P EST MOD 30 MIN: CPT

## 2019-11-09 NOTE — REVIEW OF SYSTEMS
[Diarrhea: Grade 0] : Diarrhea: Grade 0 [Nausea: Grade 0] : Nausea: Grade 0 [Proctitis: Grade 0] : Proctitis: Grade 0 [Hematuria: Grade 0] : Hematuria: Grade 0 [Urinary Incontinence: Grade 0] : Urinary Incontinence: Grade 0  [Urinary Retention: Grade 1 - Urinary, suprapubic or intermittent catheter placement not indicated; able to void with some residual] : Urinary Retention: Grade 1 - Urinary, suprapubic or intermittent catheter placement not indicated; able to void with some residual [Urinary Tract Pain: Grade 0] : Urinary Tract Pain: Grade 0 [Urinary Urgency: Grade 0] : Urinary Urgency: Grade 0 [Urinary Frequency: Grade 1 - Present] : Urinary Frequency: Grade 1 - Present [IPSS Score (0-40): ___] : IPSS score: [unfilled] [EPIC-CP Score (0-60): ___] : EPIC-CP score: [unfilled] [Negative] : Allergic/Immunologic [FreeTextEntry8] : h/o radiation to prostate cancer 1/2019

## 2019-11-09 NOTE — HISTORY OF PRESENT ILLNESS
[FreeTextEntry1] : 78 year old man with [id number] adenocarcinoma of the prostate, [place] 4+3, pre treatment PSA 6.32 ng/mL. He is s/p hypofractionated radiation to 70 Gy in 28 fractions completed on 1/14/19 with Lupron (1st 10/2018). He presents for F/U \par \par He feels well without significant urinary issues. No diarrhea. He is on alfuzosin 1 tab PO qHS.  Nocturia twice, no burning on urination. Bowel movement is okay.  PSA <0.01 in 6/2019. Advised to follow up with urologist too.

## 2019-11-09 NOTE — DISEASE MANAGEMENT
[Clinical] : TNM Stage: c [IIC] : IIC [1] : T1 [c] : c [0] : M0 [0-10] : 0 -10 ng/mL [7(4+3)] : Sonya Score 7(4+3) [Radiation Therapy] : Radiation Therapy [NTNM] : 0 [TTNM] : 2 [MTNM] : 0 [MeasuredProstateVolume] : 99

## 2019-12-31 ENCOUNTER — APPOINTMENT (OUTPATIENT)
Dept: DERMATOLOGY | Facility: CLINIC | Age: 79
End: 2019-12-31
Payer: MEDICARE

## 2019-12-31 VITALS — BODY MASS INDEX: 29.8 KG/M2 | HEIGHT: 72 IN | WEIGHT: 220 LBS

## 2019-12-31 PROCEDURE — 99214 OFFICE O/P EST MOD 30 MIN: CPT

## 2020-01-21 ENCOUNTER — RX RENEWAL (OUTPATIENT)
Age: 80
End: 2020-01-21

## 2020-01-23 LAB
ALBUMIN SERPL ELPH-MCNC: 4.5 G/DL
ALP BLD-CCNC: 77 U/L
ALT SERPL-CCNC: 19 U/L
ANION GAP SERPL CALC-SCNC: 14 MMOL/L
AST SERPL-CCNC: 20 U/L
BASOPHILS # BLD AUTO: 0.03 K/UL
BASOPHILS NFR BLD AUTO: 0.5 %
BILIRUB SERPL-MCNC: 1 MG/DL
BUN SERPL-MCNC: 18 MG/DL
CALCIUM SERPL-MCNC: 9.5 MG/DL
CHLORIDE SERPL-SCNC: 105 MMOL/L
CO2 SERPL-SCNC: 23 MMOL/L
CREAT SERPL-MCNC: 0.96 MG/DL
EOSINOPHIL # BLD AUTO: 0.25 K/UL
EOSINOPHIL NFR BLD AUTO: 3.8 %
HCT VFR BLD CALC: 45.7 %
HGB BLD-MCNC: 15.2 G/DL
IMM GRANULOCYTES NFR BLD AUTO: 0.5 %
LYMPHOCYTES # BLD AUTO: 1.04 K/UL
LYMPHOCYTES NFR BLD AUTO: 15.8 %
MAN DIFF?: NORMAL
MCHC RBC-ENTMCNC: 29.9 PG
MCHC RBC-ENTMCNC: 33.3 GM/DL
MCV RBC AUTO: 90 FL
MONOCYTES # BLD AUTO: 0.56 K/UL
MONOCYTES NFR BLD AUTO: 8.5 %
NEUTROPHILS # BLD AUTO: 4.67 K/UL
NEUTROPHILS NFR BLD AUTO: 70.9 %
PLATELET # BLD AUTO: 186 K/UL
POTASSIUM SERPL-SCNC: 4 MMOL/L
PROT SERPL-MCNC: 6.7 G/DL
RBC # BLD: 5.08 M/UL
RBC # FLD: 14.6 %
SODIUM SERPL-SCNC: 142 MMOL/L
WBC # FLD AUTO: 6.58 K/UL

## 2020-02-25 ENCOUNTER — APPOINTMENT (OUTPATIENT)
Dept: DERMATOLOGY | Facility: CLINIC | Age: 80
End: 2020-02-25
Payer: MEDICARE

## 2020-02-25 PROCEDURE — 99214 OFFICE O/P EST MOD 30 MIN: CPT

## 2020-03-05 LAB
ALBUMIN SERPL ELPH-MCNC: 4.4 G/DL
ALP BLD-CCNC: 70 U/L
ALT SERPL-CCNC: 16 U/L
ANION GAP SERPL CALC-SCNC: 15 MMOL/L
AST SERPL-CCNC: 18 U/L
BASOPHILS # BLD AUTO: 0.02 K/UL
BASOPHILS NFR BLD AUTO: 0.3 %
BILIRUB SERPL-MCNC: 0.8 MG/DL
BUN SERPL-MCNC: 17 MG/DL
CALCIUM SERPL-MCNC: 9.4 MG/DL
CHLORIDE SERPL-SCNC: 104 MMOL/L
CO2 SERPL-SCNC: 24 MMOL/L
CREAT SERPL-MCNC: 0.94 MG/DL
EOSINOPHIL # BLD AUTO: 0.33 K/UL
EOSINOPHIL NFR BLD AUTO: 5.1 %
GLUCOSE SERPL-MCNC: 123 MG/DL
HCT VFR BLD CALC: 48 %
HGB BLD-MCNC: 15.7 G/DL
IMM GRANULOCYTES NFR BLD AUTO: 0.3 %
LYMPHOCYTES # BLD AUTO: 1.01 K/UL
LYMPHOCYTES NFR BLD AUTO: 15.6 %
MAN DIFF?: NORMAL
MCHC RBC-ENTMCNC: 30.1 PG
MCHC RBC-ENTMCNC: 32.7 GM/DL
MCV RBC AUTO: 92.1 FL
MONOCYTES # BLD AUTO: 0.54 K/UL
MONOCYTES NFR BLD AUTO: 8.3 %
NEUTROPHILS # BLD AUTO: 4.55 K/UL
NEUTROPHILS NFR BLD AUTO: 70.4 %
PLATELET # BLD AUTO: 200 K/UL
POTASSIUM SERPL-SCNC: 4.5 MMOL/L
PROT SERPL-MCNC: 6.6 G/DL
RBC # BLD: 5.21 M/UL
RBC # FLD: 15.7 %
SODIUM SERPL-SCNC: 143 MMOL/L
WBC # FLD AUTO: 6.47 K/UL

## 2020-03-11 ENCOUNTER — APPOINTMENT (OUTPATIENT)
Dept: UROLOGY | Facility: CLINIC | Age: 80
End: 2020-03-11

## 2020-05-03 LAB — PSA SERPL-MCNC: 0.07 NG/ML

## 2020-05-08 ENCOUNTER — APPOINTMENT (OUTPATIENT)
Dept: RADIATION ONCOLOGY | Facility: CLINIC | Age: 80
End: 2020-05-08
Payer: MEDICARE

## 2020-05-08 PROCEDURE — 99442: CPT | Mod: CR

## 2020-05-08 RX ORDER — 70%ISOPROPYL ALCOHOL 0.7 ML/ML
70 SWAB TOPICAL
Refills: 0 | Status: ACTIVE | COMMUNITY

## 2020-05-11 NOTE — REVIEW OF SYSTEMS
[IPSS Score (0-40): ___] : IPSS score: [unfilled] [EPIC-CP Score (0-60): ___] : EPIC-CP score: [unfilled] [Negative] : Allergic/Immunologic [Nausea: Grade 0] : Nausea: Grade 0 [Diarrhea: Grade 0] : Diarrhea: Grade 0 [Proctitis: Grade 0] : Proctitis: Grade 0 [Hematuria: Grade 0] : Hematuria: Grade 0 [Urinary Incontinence: Grade 0] : Urinary Incontinence: Grade 0  [Urinary Retention: Grade 1 - Urinary, suprapubic or intermittent catheter placement not indicated; able to void with some residual] : Urinary Retention: Grade 1 - Urinary, suprapubic or intermittent catheter placement not indicated; able to void with some residual [Urinary Urgency: Grade 0] : Urinary Urgency: Grade 0 [Urinary Tract Pain: Grade 0] : Urinary Tract Pain: Grade 0 [Urinary Frequency: Grade 1 - Present] : Urinary Frequency: Grade 1 - Present [Ejaculation Disorder: Grade 1 - Diminished ejaculation] : Ejaculation Disorder: Grade 1 - Diminished ejaculation  [Erectile Dysfunction: Grade 2 - Decrease in erectile function (frequency/rigidity of erections), erectile intervention indicated, (e.g., medication or mechanical devices such as penile pump)] : Erectile Dysfunction: Grade 2 - Decrease in erectile function (frequency/rigidity of erections), erectile intervention indicated, (e.g., medication or mechanical devices such as penile pump) [FreeTextEntry8] : h/o radiation to prostate cancer 1/2019

## 2020-05-11 NOTE — HISTORY OF PRESENT ILLNESS
[FreeTextEntry1] : Phone call F/U for 15 min (due to COVID 19 crisis)\par \par 78 year old man with [id number] adenocarcinoma of the prostate, [place] 4+3, pre treatment PSA 6.32 ng/mL. He is s/p hypofractionated radiation to 70 Gy in 28 fractions completed on 1/14/19 with Lupron (1st 10/2018). He presents for F/U \par \par He feels well without significant urinary issues. No diarrhea. He is on alfuzosin 1 tab PO qHS.  Nocturia twice, no burning on urination. Bowel movement is okay.  PSA 0.07 in 4/2020. Advised to follow up with urologist too. \par \par Plan: RTC in 6 months, blood PSA test before next visit

## 2020-05-11 NOTE — DISEASE MANAGEMENT
[Clinical] : TNM Stage: c [IIC] : IIC [1] : T1 [c] : c [0] : M0 [0-10] : 0 -10 ng/mL [7(4+3)] : Sonya Score 7(4+3) [Radiation Therapy] : Radiation Therapy [TTNM] : 2 [NTNM] : 0 [MTNM] : 0 [MeasuredProstateVolume] : 99

## 2020-05-19 ENCOUNTER — APPOINTMENT (OUTPATIENT)
Dept: DERMATOLOGY | Facility: CLINIC | Age: 80
End: 2020-05-19
Payer: MEDICARE

## 2020-05-19 PROCEDURE — 99214 OFFICE O/P EST MOD 30 MIN: CPT | Mod: 95

## 2020-05-26 ENCOUNTER — APPOINTMENT (OUTPATIENT)
Dept: DERMATOLOGY | Facility: CLINIC | Age: 80
End: 2020-05-26

## 2020-08-19 ENCOUNTER — LABORATORY RESULT (OUTPATIENT)
Age: 80
End: 2020-08-19

## 2020-08-20 ENCOUNTER — APPOINTMENT (OUTPATIENT)
Dept: DERMATOLOGY | Facility: CLINIC | Age: 80
End: 2020-08-20
Payer: MEDICARE

## 2020-08-20 VITALS — BODY MASS INDEX: 29.12 KG/M2 | HEIGHT: 72 IN | WEIGHT: 215 LBS

## 2020-08-20 DIAGNOSIS — D48.5 NEOPLASM OF UNCERTAIN BEHAVIOR OF SKIN: ICD-10-CM

## 2020-08-20 DIAGNOSIS — R23.8 OTHER SKIN CHANGES: ICD-10-CM

## 2020-08-20 PROCEDURE — 99214 OFFICE O/P EST MOD 30 MIN: CPT | Mod: 25

## 2020-08-20 PROCEDURE — 11102 TANGNTL BX SKIN SINGLE LES: CPT

## 2020-08-21 LAB
ALBUMIN SERPL ELPH-MCNC: 4.4 G/DL
ALP BLD-CCNC: 65 U/L
ALT SERPL-CCNC: 26 U/L
ANION GAP SERPL CALC-SCNC: 15 MMOL/L
AST SERPL-CCNC: 26 U/L
BASOPHILS # BLD AUTO: 0.03 K/UL
BASOPHILS NFR BLD AUTO: 0.5 %
BILIRUB SERPL-MCNC: 1 MG/DL
BUN SERPL-MCNC: 20 MG/DL
CALCIUM SERPL-MCNC: 9.3 MG/DL
CHLORIDE SERPL-SCNC: 104 MMOL/L
CO2 SERPL-SCNC: 22 MMOL/L
CREAT SERPL-MCNC: 0.95 MG/DL
EOSINOPHIL # BLD AUTO: 0.18 K/UL
EOSINOPHIL NFR BLD AUTO: 3.2 %
GLUCOSE SERPL-MCNC: 132 MG/DL
HCT VFR BLD CALC: 47.6 %
HGB BLD-MCNC: 15.6 G/DL
IMM GRANULOCYTES NFR BLD AUTO: 0.4 %
LYMPHOCYTES # BLD AUTO: 0.93 K/UL
LYMPHOCYTES NFR BLD AUTO: 16.7 %
MAN DIFF?: NORMAL
MCHC RBC-ENTMCNC: 32.5 PG
MCHC RBC-ENTMCNC: 32.8 GM/DL
MCV RBC AUTO: 99.2 FL
MONOCYTES # BLD AUTO: 0.45 K/UL
MONOCYTES NFR BLD AUTO: 8.1 %
NEUTROPHILS # BLD AUTO: 3.96 K/UL
NEUTROPHILS NFR BLD AUTO: 71.1 %
PLATELET # BLD AUTO: 183 K/UL
POTASSIUM SERPL-SCNC: 4.3 MMOL/L
PROT SERPL-MCNC: 6.3 G/DL
RBC # BLD: 4.8 M/UL
RBC # FLD: 13.8 %
SODIUM SERPL-SCNC: 141 MMOL/L
WBC # FLD AUTO: 5.57 K/UL

## 2020-08-25 NOTE — HISTORY OF PRESENT ILLNESS
[FreeTextEntry1] : f/u: psoriasis [de-identified] : 78 y/o M f/u for f/u:\par \par 1) Psoriasis on the trunk/ext. Had been on Mtx 15mg/wk + FA since Jan 2020\par Significantly improved on trunk and extr, no assoc sxs/mod. Tolerating mtx, no f/c/sob. Labs wnl from 03/2020\par Occasionally using clobetasol cream when he remembers\par 2) Spot on left cheek- Present for about 1 year, not going away despite use of clobetasol and Vaseline to area. Occasionally bleeds when pt picks at it.\par \par Presenting hx: \par 78M h/o DM2 here for eval of psoriasis x 2y involving arms, back, legs. Itchy. Prior tx with outside dermatologist include excimer (x 1-2m), phototherapy (x 1-2m), otezla (x2-3m) and clobetasol (x few months), none of which were particularly helpful. He reports joint pain x 2-3 weeks in his b/l ankles. Worse in the morning, relieved with activity and with Aleve.\par \par PMH: T2DM, no h/o skin cancer\par FH: no fhx of skin cancer or psoriasis\par SH: drinks a glass of wine nightly, rarely uses sunscreen

## 2020-08-25 NOTE — ASSESSMENT
[FreeTextEntry1] : 1) Neoplasm of uncertain behavior\par Location: Left cheek\par DDx: R/o bcc vs ak\par Biopsy by shave\par The risks/benefits/alternatives of skin biopsy were explained to the patient, which include and are not limited to bleeding, infection, scarring or discoloration of skin, and recurrence of lesion. Patient expressed understanding of these risks and provided consent to the procedure. Time out with verification of patient and lesion site was performed. Site was prepped with rubbing alcohol, lidocaine with epinephrine was injected for anesthesia, and biopsy was performed. Specimen sent to path.  Procedure was without complication and well tolerated. Wound care was discussed.\par \par 2) Skin papule, concern for NMSC\par -SBx as above\par -Recommended FBSE at following visit\par \par 3) Plaque PsO, mod/sev initially, now clear with mostly PIH\par - Cont MTX 15 mg weekly + FA, SED\par - Cont clobetasol to thick plaques, 2w on1 w off, SED\par Prior tx includes excimer, phototherapy, otezla, clobetasol\par Previously advised pt he must limit alcohol to 1-2 drinks a week while on MTX and should not take Aleve more than once a day\par \par 4) High risk medication use\par - Recheck CBC/CMP\par \par RTC 3 months

## 2020-08-25 NOTE — PHYSICAL EXAM
[Alert] : alert [Oriented x 3] : ~L oriented x 3 [Well Nourished] : well nourished [Conjunctiva Non-injected] : conjunctiva non-injected [No Visual Lymphadenopathy] : no visual  lymphadenopathy [No Clubbing] : no clubbing [No Bromhidrosis] : no bromhidrosis [No Edema] : no edema [No Chromhidrosis] : no chromhidrosis [FreeTextEntry3] : Exam notable for:\par Some hypopigmented and dark red erythematous patches over R knee, elbows, and in lumbosacral area

## 2020-11-04 LAB — PSA SERPL-MCNC: 0.08 NG/ML

## 2020-11-19 ENCOUNTER — APPOINTMENT (OUTPATIENT)
Dept: RADIATION ONCOLOGY | Facility: CLINIC | Age: 80
End: 2020-11-19
Payer: MEDICARE

## 2020-11-19 VITALS — RESPIRATION RATE: 16 BRPM | BODY MASS INDEX: 29.12 KG/M2 | WEIGHT: 215 LBS | HEIGHT: 72 IN

## 2020-11-19 PROCEDURE — 99214 OFFICE O/P EST MOD 30 MIN: CPT | Mod: GC

## 2020-11-19 RX ORDER — SILDENAFIL 100 MG/1
100 TABLET, FILM COATED ORAL
Qty: 10 | Refills: 5 | Status: ACTIVE | COMMUNITY
Start: 2020-11-19 | End: 1900-01-01

## 2020-11-19 NOTE — PHYSICAL EXAM
[Normal] : well developed, well nourished, in no acute distress [Sclera] : the sclera and conjunctiva were normal [Outer Ear] : the ears and nose were normal in appearance [] : no respiratory distress [Heart Rate And Rhythm] : heart rate and rhythm were normal

## 2020-11-23 NOTE — REVIEW OF SYSTEMS
[IPSS Score (0-40): ___] : IPSS score: [unfilled] [EPIC-CP Score (0-60): ___] : EPIC-CP score: [unfilled] [Negative] : Allergic/Immunologic [Diarrhea: Grade 0] : Diarrhea: Grade 0 [Nausea: Grade 0] : Nausea: Grade 0 [Proctitis: Grade 0] : Proctitis: Grade 0 [Hematuria: Grade 0] : Hematuria: Grade 0 [Urinary Incontinence: Grade 0] : Urinary Incontinence: Grade 0  [Urinary Retention: Grade 1 - Urinary, suprapubic or intermittent catheter placement not indicated; able to void with some residual] : Urinary Retention: Grade 1 - Urinary, suprapubic or intermittent catheter placement not indicated; able to void with some residual [Urinary Tract Pain: Grade 0] : Urinary Tract Pain: Grade 0 [Urinary Urgency: Grade 0] : Urinary Urgency: Grade 0 [Urinary Frequency: Grade 1 - Present] : Urinary Frequency: Grade 1 - Present [Ejaculation Disorder: Grade 1 - Diminished ejaculation] : Ejaculation Disorder: Grade 1 - Diminished ejaculation  [Erectile Dysfunction: Grade 2 - Decrease in erectile function (frequency/rigidity of erections), erectile intervention indicated, (e.g., medication or mechanical devices such as penile pump)] : Erectile Dysfunction: Grade 2 - Decrease in erectile function (frequency/rigidity of erections), erectile intervention indicated, (e.g., medication or mechanical devices such as penile pump) [FreeTextEntry8] : h/o radiation to prostate cancer 1/2019

## 2020-11-23 NOTE — END OF VISIT
[] : Resident [FreeTextEntry3] : F/U in 6months-1yr. [Time Spent: ___ minutes] : I have spent [unfilled] minutes of time on the encounter. [>50% of the face to face encounter time was spent on counseling and/or coordination of care for ___] : Greater than 50% of the face to face encounter time was spent on counseling and/or coordination of care for [unfilled]

## 2020-11-24 ENCOUNTER — APPOINTMENT (OUTPATIENT)
Dept: DERMATOLOGY | Facility: CLINIC | Age: 80
End: 2020-11-24

## 2020-12-08 ENCOUNTER — APPOINTMENT (OUTPATIENT)
Dept: DERMATOLOGY | Facility: CLINIC | Age: 80
End: 2020-12-08
Payer: MEDICARE

## 2020-12-08 PROCEDURE — 17003 DESTRUCT PREMALG LES 2-14: CPT

## 2020-12-08 PROCEDURE — 99214 OFFICE O/P EST MOD 30 MIN: CPT | Mod: 25

## 2020-12-08 PROCEDURE — 17000 DESTRUCT PREMALG LESION: CPT

## 2020-12-09 LAB
ALBUMIN SERPL ELPH-MCNC: 4.4 G/DL
ALP BLD-CCNC: 76 U/L
ALT SERPL-CCNC: 27 U/L
ANION GAP SERPL CALC-SCNC: 11 MMOL/L
AST SERPL-CCNC: 25 U/L
BASOPHILS # BLD AUTO: 0.03 K/UL
BASOPHILS NFR BLD AUTO: 0.4 %
BILIRUB SERPL-MCNC: 0.8 MG/DL
BUN SERPL-MCNC: 15 MG/DL
CALCIUM SERPL-MCNC: 9.4 MG/DL
CHLORIDE SERPL-SCNC: 105 MMOL/L
CO2 SERPL-SCNC: 25 MMOL/L
CREAT SERPL-MCNC: 0.95 MG/DL
EOSINOPHIL # BLD AUTO: 0.3 K/UL
EOSINOPHIL NFR BLD AUTO: 4.2 %
GLUCOSE SERPL-MCNC: 132 MG/DL
HCT VFR BLD CALC: 45.1 %
HGB BLD-MCNC: 15.7 G/DL
IMM GRANULOCYTES NFR BLD AUTO: 0.6 %
LYMPHOCYTES # BLD AUTO: 1.04 K/UL
LYMPHOCYTES NFR BLD AUTO: 14.5 %
MAN DIFF?: NORMAL
MCHC RBC-ENTMCNC: 33.8 PG
MCHC RBC-ENTMCNC: 34.8 GM/DL
MCV RBC AUTO: 97.2 FL
MONOCYTES # BLD AUTO: 0.71 K/UL
MONOCYTES NFR BLD AUTO: 9.9 %
NEUTROPHILS # BLD AUTO: 5.07 K/UL
NEUTROPHILS NFR BLD AUTO: 70.4 %
PLATELET # BLD AUTO: 181 K/UL
POTASSIUM SERPL-SCNC: 4.6 MMOL/L
PROT SERPL-MCNC: 6.5 G/DL
RBC # BLD: 4.64 M/UL
RBC # FLD: 14.1 %
SODIUM SERPL-SCNC: 141 MMOL/L
WBC # FLD AUTO: 7.19 K/UL

## 2020-12-14 ENCOUNTER — NON-APPOINTMENT (OUTPATIENT)
Age: 80
End: 2020-12-14

## 2021-03-05 ENCOUNTER — APPOINTMENT (OUTPATIENT)
Dept: UROLOGY | Facility: CLINIC | Age: 81
End: 2021-03-05
Payer: MEDICARE

## 2021-03-05 ENCOUNTER — TRANSCRIPTION ENCOUNTER (OUTPATIENT)
Age: 81
End: 2021-03-05

## 2021-03-05 PROCEDURE — 99213 OFFICE O/P EST LOW 20 MIN: CPT | Mod: 25

## 2021-03-05 PROCEDURE — 51798 US URINE CAPACITY MEASURE: CPT

## 2021-03-05 NOTE — HISTORY OF PRESENT ILLNESS
[FreeTextEntry1] : Pt.c/o nocturia x1 \par Urge incontinence\par Previous radiation therapy for CaP\par PVR=26ml\par Previous PSA 0.01\par Repeat PSA

## 2021-03-05 NOTE — PHYSICAL EXAM
[Abdomen Soft] : soft [Abdomen Tenderness] : non-tender [Costovertebral Angle Tenderness] : no ~M costovertebral angle tenderness [Urethral Meatus] : meatus normal [Penis Abnormality] : normal circumcised penis [Rectal Exam - Seminal Vesicles] : the seminal vesicles were normal [Testes Tenderness] : no tenderness of the testes [Testes Mass (___cm)] : there were no testicular masses [Anus Abnormality] : the anus and perineum were normal [Rectal Exam - Rectum] : digital rectal exam was normal [Prostate Tenderness] : the prostate was not tender [No Prostate Nodules] : no prostate nodules [Prostate Size ___ gm] : prostate size [unfilled] gm [Edema] : no peripheral edema [] : no respiratory distress [Respiration, Rhythm And Depth] : normal respiratory rhythm and effort [Exaggerated Use Of Accessory Muscles For Inspiration] : no accessory muscle use [FreeTextEntry1] : left epididymal cyst. and left varicocele

## 2021-03-07 LAB
CREAT SERPL-MCNC: 1.02 MG/DL
PSA SERPL-MCNC: 0.11 NG/ML

## 2021-04-05 ENCOUNTER — APPOINTMENT (OUTPATIENT)
Dept: UROLOGY | Facility: CLINIC | Age: 81
End: 2021-04-05

## 2021-04-06 ENCOUNTER — APPOINTMENT (OUTPATIENT)
Dept: UROLOGY | Facility: CLINIC | Age: 81
End: 2021-04-06
Payer: MEDICARE

## 2021-04-06 VITALS
SYSTOLIC BLOOD PRESSURE: 119 MMHG | HEIGHT: 72 IN | DIASTOLIC BLOOD PRESSURE: 74 MMHG | BODY MASS INDEX: 29.8 KG/M2 | TEMPERATURE: 97.4 F | RESPIRATION RATE: 17 BRPM | HEART RATE: 70 BPM | WEIGHT: 220 LBS

## 2021-04-06 PROCEDURE — 51798 US URINE CAPACITY MEASURE: CPT

## 2021-04-06 PROCEDURE — 99212 OFFICE O/P EST SF 10 MIN: CPT

## 2021-04-06 RX ORDER — SOLIFENACIN SUCCINATE 5 MG/1
5 TABLET ORAL DAILY
Qty: 90 | Refills: 3 | Status: ACTIVE | COMMUNITY
Start: 2021-03-05 | End: 1900-01-01

## 2021-04-06 NOTE — HISTORY OF PRESENT ILLNESS
[FreeTextEntry1] : Started vesicare with some improvement. \par Nocturia x1\par Not much diurnal frequency \par Pt.seems satisfied \par PVR=41ml\par Pt.had CaP and RT.\par Continue with Vesicare

## 2021-04-06 NOTE — PHYSICAL EXAM
[Abdomen Soft] : soft [Abdomen Tenderness] : non-tender [Costovertebral Angle Tenderness] : no ~M costovertebral angle tenderness [Urethral Meatus] : meatus normal [Penis Abnormality] : normal circumcised penis [Rectal Exam - Seminal Vesicles] : the seminal vesicles were normal [Testes Tenderness] : no tenderness of the testes [Testes Mass (___cm)] : there were no testicular masses [Anus Abnormality] : the anus and perineum were normal [Rectal Exam - Rectum] : digital rectal exam was normal [Prostate Tenderness] : the prostate was not tender [No Prostate Nodules] : no prostate nodules [Prostate Size ___ gm] : prostate size [unfilled] gm [FreeTextEntry1] : left epididymal cyst. and left varicocele  [Edema] : no peripheral edema [] : no respiratory distress [Respiration, Rhythm And Depth] : normal respiratory rhythm and effort [Exaggerated Use Of Accessory Muscles For Inspiration] : no accessory muscle use

## 2021-04-06 NOTE — ASSESSMENT
[FreeTextEntry1] : Started vesicare with some improvement. \par Nocturia x1\par Not much diurnal frequency \par Pt.seems satisfied \par PVR=41ml\par Pt.had CaP and RT.\par Continue with Vesicare\par See in 4 months

## 2021-05-07 NOTE — VITALS
Spoke with pt's step-mother, Beto Michael ([phone]), to let her know that pt can have his cell phone if she's able to drop it off  She stated that she will drop it off today  She also reported that she obtained some additional info that might be helpful  She stated that pt's father is dx'ed with bipolar d/o and their is family hx of schizophrenia on paternal side of family  CM gave update on progress and anticipated d/c plan  Beto Rodriguez stated that she spoke with pt yesterday and it went well  She is looking forward to pt's return home  [Maximal Pain Intensity: 0/10] : 0/10 [Least Pain Intensity: 0/10] : 0/10 [80: Normal activity with effort; some signs or symptoms of disease.] : 80: Normal activity with effort; some signs or symptoms of disease.  [ECOG Performance Status: 1 - Restricted in physically strenuous activity but ambulatory and able to carry out work of a light or sedentary nature] : Performance Status: 1 - Restricted in physically strenuous activity but ambulatory and able to carry out work of a light or sedentary nature, e.g., light house work, office work

## 2021-05-10 LAB — PSA SERPL-MCNC: 0.13 NG/ML

## 2021-05-20 ENCOUNTER — APPOINTMENT (OUTPATIENT)
Dept: RADIATION ONCOLOGY | Facility: CLINIC | Age: 81
End: 2021-05-20
Payer: MEDICARE

## 2021-05-20 VITALS
HEART RATE: 67 BPM | OXYGEN SATURATION: 97 % | SYSTOLIC BLOOD PRESSURE: 118 MMHG | TEMPERATURE: 97.3 F | DIASTOLIC BLOOD PRESSURE: 77 MMHG | WEIGHT: 220 LBS | RESPIRATION RATE: 17 BRPM | BODY MASS INDEX: 29.84 KG/M2

## 2021-05-20 PROCEDURE — 99214 OFFICE O/P EST MOD 30 MIN: CPT | Mod: GC

## 2021-05-20 NOTE — PHYSICAL EXAM
[Normal] : well developed, well nourished, in no acute distress [Sclera] : the sclera and conjunctiva were normal [] : no respiratory distress [Outer Ear] : the ears and nose were normal in appearance [Heart Rate And Rhythm] : heart rate and rhythm were normal [No Focal Deficits] : no focal deficits [Oriented To Time, Place, And Person] : oriented to person, place, and time

## 2021-05-23 NOTE — HISTORY OF PRESENT ILLNESS
[FreeTextEntry1] : 78 year old man with [id number] adenocarcinoma of the prostate, Sonya 4+3, pre treatment PSA 6.32 ng/mL. He is s/p hypofractionated radiation to 70 Gy in 28 fractions completed on 1/14/19 with Lupron (1st 10/2018). He presents for F/U. \par \par He feels well without significant urinary issues, nocturia 0-1 months. On Vesicare from urologist. He has constipation over the last few months, every 3 day bowel movements, with straining and visible blood when wiping. Will try a trial of Metamucil and Milk of Mg as backup, as well as diet changes.\par \par PSA Trend \par 08/2019 - 6.32\par 05/2019 - <0.01\par 4/22/20 - 0.07\par 10/28/20 - 0.08\par 3/05/21 - .11\par 5/06/21 - .13

## 2021-05-23 NOTE — HISTORY OF PRESENT ILLNESS
[FreeTextEntry1] : 78 year old man with [id number] adenocarcinoma of the prostate, Sonya 4+3, pre treatment PSA 6.32 ng/mL. He is s/p hypofractionated radiation to 70 Gy in 28 fractions completed on 1/14/19 with Lupron (1st 10/2018). He presents for F/U. \par \par He feels well without significant urinary issues, nocturia 0-1 months. On Vesicare from urologist. He has constipation over the last few months, every 3 day bowel movements, with straining and visible blood when wiping. Will try a trial of Metamucil and Milk of Mg as backup, as well as diet changes.\par \par PSA Trend \par 08/2019 - 6.32\par 05/2019 - <0.01\par 4/22/20 - 0.07\par 10/28/20 - 0.08\par 3/05/21 - .11\par 5/06/21 - .13

## 2021-05-23 NOTE — REVIEW OF SYSTEMS
[IPSS Score (0-40): ___] : IPSS score: [unfilled] [EPIC-CP Score (0-60): ___] : EPIC-CP score: [unfilled] [Negative] : Allergic/Immunologic [Nausea: Grade 0] : Nausea: Grade 0 [Diarrhea: Grade 0] : Diarrhea: Grade 0 [Proctitis: Grade 0] : Proctitis: Grade 0 [Hematuria: Grade 0] : Hematuria: Grade 0 [Urinary Incontinence: Grade 0] : Urinary Incontinence: Grade 0  [Urinary Retention: Grade 1 - Urinary, suprapubic or intermittent catheter placement not indicated; able to void with some residual] : Urinary Retention: Grade 1 - Urinary, suprapubic or intermittent catheter placement not indicated; able to void with some residual [Urinary Tract Pain: Grade 0] : Urinary Tract Pain: Grade 0 [Urinary Urgency: Grade 0] : Urinary Urgency: Grade 0 [Urinary Frequency: Grade 1 - Present] : Urinary Frequency: Grade 1 - Present [Ejaculation Disorder: Grade 1 - Diminished ejaculation] : Ejaculation Disorder: Grade 1 - Diminished ejaculation  [Erectile Dysfunction: Grade 2 - Decrease in erectile function (frequency/rigidity of erections), erectile intervention indicated, (e.g., medication or mechanical devices such as penile pump)] : Erectile Dysfunction: Grade 2 - Decrease in erectile function (frequency/rigidity of erections), erectile intervention indicated, (e.g., medication or mechanical devices such as penile pump) [FreeTextEntry8] : h/o radiation to prostate cancer 1/2019

## 2021-05-23 NOTE — DISEASE MANAGEMENT
[Clinical] : TNM Stage: c [IIC] : IIC [1] : T1 [c] : c [0] : M0 [0-10] : 0 -10 ng/mL [7(4+3)] : Sonya Score 7(4+3) [Radiation Therapy] : Radiation Therapy [TTNM] : 2 [NTNM] : 0 [MeasuredProstateVolume] : 99 [MTNM] : 0

## 2021-06-01 ENCOUNTER — RX RENEWAL (OUTPATIENT)
Age: 81
End: 2021-06-01

## 2021-08-02 ENCOUNTER — APPOINTMENT (OUTPATIENT)
Dept: UROLOGY | Facility: CLINIC | Age: 81
End: 2021-08-02
Payer: MEDICARE

## 2021-08-02 VITALS
RESPIRATION RATE: 16 BRPM | DIASTOLIC BLOOD PRESSURE: 82 MMHG | TEMPERATURE: 98 F | HEIGHT: 72 IN | SYSTOLIC BLOOD PRESSURE: 124 MMHG | WEIGHT: 215 LBS | HEART RATE: 56 BPM | BODY MASS INDEX: 29.12 KG/M2

## 2021-08-02 DIAGNOSIS — R35.0 FREQUENCY OF MICTURITION: ICD-10-CM

## 2021-08-02 DIAGNOSIS — N52.9 MALE ERECTILE DYSFUNCTION, UNSPECIFIED: ICD-10-CM

## 2021-08-02 PROCEDURE — 99212 OFFICE O/P EST SF 10 MIN: CPT

## 2021-08-02 RX ORDER — SILDENAFIL 100 MG/1
100 TABLET, FILM COATED ORAL
Qty: 12 | Refills: 3 | Status: ACTIVE | COMMUNITY
Start: 2021-08-02 | End: 1900-01-01

## 2021-08-02 NOTE — ASSESSMENT
[FreeTextEntry1] : The patient is a 80 year old male presenting today for a follow up visit for CaP. \par He reports his urinary control is good. \par He is on Vesicare 5 mg, one tablet at night and feels that it is effective. \par His most recent PSA from 5/6/21 was 0.13. \par He also sees Dr. Toscano, and reports he is having blood work done next week. \par \par The digital rectal exam was normal with a nonpalpable prostate and no sign of any recurrence.\par \par The patient should return to the office in 4 months.\par \par I spent 15 minutes with the patient.

## 2021-08-02 NOTE — HISTORY OF PRESENT ILLNESS
[FreeTextEntry1] : The patient is a 80 year old male presenting today for a follow up visit for CaP. \par He reports his urinary control is good. \par He is on Vesicare 5 mg, one tablet at night and feels that it is effective. \par His most recent PSA from 5/6/21 was 0.13. \par He also sees Dr. Toscano, and reports he is having blood work done next week. \par \par The digital rectal exam was normal with a nonpalpable prostate and no sign of any recurrence.\par \par The patient should return to the office in 4 months.

## 2021-08-02 NOTE — PHYSICAL EXAM
[General Appearance - Well Developed] : well developed [General Appearance - Well Nourished] : well nourished [Normal Appearance] : normal appearance [Well Groomed] : well groomed [General Appearance - In No Acute Distress] : no acute distress [Abdomen Soft] : soft [Abdomen Tenderness] : non-tender [Edema] : no peripheral edema [] : no respiratory distress [Respiration, Rhythm And Depth] : normal respiratory rhythm and effort [Exaggerated Use Of Accessory Muscles For Inspiration] : no accessory muscle use [Oriented To Time, Place, And Person] : oriented to person, place, and time [Affect] : the affect was normal [Not Anxious] : not anxious [Mood] : the mood was normal [Normal Station and Gait] : the gait and station were normal for the patient's age [No Focal Deficits] : no focal deficits [No Palpable Adenopathy] : no palpable adenopathy [FreeTextEntry1] : The digital rectal exam was normal with a nonpalpable prostate and no sign of any recurrence.

## 2021-10-18 ENCOUNTER — RX RENEWAL (OUTPATIENT)
Age: 81
End: 2021-10-18

## 2021-11-23 ENCOUNTER — APPOINTMENT (OUTPATIENT)
Dept: DERMATOLOGY | Facility: CLINIC | Age: 81
End: 2021-11-23
Payer: MEDICARE

## 2021-11-23 PROCEDURE — 17003 DESTRUCT PREMALG LES 2-14: CPT

## 2021-11-23 PROCEDURE — 99214 OFFICE O/P EST MOD 30 MIN: CPT | Mod: 25

## 2021-11-23 PROCEDURE — 17000 DESTRUCT PREMALG LESION: CPT

## 2021-12-02 ENCOUNTER — APPOINTMENT (OUTPATIENT)
Dept: RADIATION ONCOLOGY | Facility: CLINIC | Age: 81
End: 2021-12-02

## 2021-12-09 ENCOUNTER — APPOINTMENT (OUTPATIENT)
Dept: RHEUMATOLOGY | Facility: CLINIC | Age: 81
End: 2021-12-09
Payer: MEDICARE

## 2021-12-09 VITALS
TEMPERATURE: 97.3 F | BODY MASS INDEX: 28.44 KG/M2 | SYSTOLIC BLOOD PRESSURE: 120 MMHG | HEIGHT: 72 IN | OXYGEN SATURATION: 95 % | DIASTOLIC BLOOD PRESSURE: 74 MMHG | HEART RATE: 73 BPM | WEIGHT: 210 LBS

## 2021-12-09 DIAGNOSIS — M25.551 PAIN IN RIGHT HIP: ICD-10-CM

## 2021-12-09 DIAGNOSIS — M25.562 PAIN IN LEFT KNEE: ICD-10-CM

## 2021-12-09 DIAGNOSIS — M25.552 PAIN IN RIGHT HIP: ICD-10-CM

## 2021-12-09 DIAGNOSIS — M17.10 UNILATERAL PRIMARY OSTEOARTHRITIS, UNSPECIFIED KNEE: ICD-10-CM

## 2021-12-09 DIAGNOSIS — M16.9 OSTEOARTHRITIS OF HIP, UNSPECIFIED: ICD-10-CM

## 2021-12-09 PROCEDURE — 99204 OFFICE O/P NEW MOD 45 MIN: CPT

## 2021-12-09 NOTE — CONSULT LETTER
[Dear  ___] : Dear  [unfilled], [Please see my note below.] : Please see my note below. [Sincerely,] : Sincerely, [FreeTextEntry3] : Lavonne Nieves MD\par , [facility]\par Division of Rheumatology\par

## 2021-12-09 NOTE — HISTORY OF PRESENT ILLNESS
[FreeTextEntry1] : 80-year-old man presenting for evaluation of knee pain\par \par -He reports new onset of left knee pain over the past few weeks\par Denies any preceding injury/fall/trauma\par No previous similar episode in the past\par No associated swelling, redness or warmth\par Pain is worse after sitting for prolonged period of time and with standing, improves with walking\par Reports feeling cracking sensation when moving the knee\par -Reports occasional pain at the ankles\par -Denies any other joint pain\par No history of joint swelling\par Not previously diagnosed with psoriatic arthritis\par \par -Psoriasis\par Under the care of dermatology\par Has been relatively well controlled on methotrexate\par

## 2021-12-09 NOTE — PHYSICAL EXAM
[General Appearance - Alert] : alert [General Appearance - In No Acute Distress] : in no acute distress [Musculoskeletal - Swelling] : no joint swelling seen [Oriented To Time, Place, And Person] : oriented to person, place, and time [FreeTextEntry1] : painful ROM at the left knee, no swelling.

## 2021-12-09 NOTE — ASSESSMENT
[FreeTextEntry1] : #New onset of left knee pain\par -History and exam is consistent with OA\par -Obtain today x-rays of the knee\par -Obtain x-rays of the hip to rule out radiating pain\par -Discussed the nature of OA as well as treatment options\par Recommend Tylenol arthritis as needed for pain\par Topical diclofenac as needed for pain\par Physical therapy referral\par Depending on above can discuss the role of corticosteroid injections\par -No evidence of psoriatic arthritis at this time\par \par \par We will call with results\par \par Patient aware of my assessment and plan, all questions answered.\par

## 2021-12-13 ENCOUNTER — APPOINTMENT (OUTPATIENT)
Dept: RADIOLOGY | Facility: IMAGING CENTER | Age: 81
End: 2021-12-13
Payer: MEDICARE

## 2021-12-13 ENCOUNTER — RESULT REVIEW (OUTPATIENT)
Age: 81
End: 2021-12-13

## 2021-12-13 ENCOUNTER — OUTPATIENT (OUTPATIENT)
Dept: OUTPATIENT SERVICES | Facility: HOSPITAL | Age: 81
LOS: 1 days | End: 2021-12-13
Payer: MEDICARE

## 2021-12-13 DIAGNOSIS — M25.562 PAIN IN LEFT KNEE: ICD-10-CM

## 2021-12-13 DIAGNOSIS — M25.551 PAIN IN RIGHT HIP: ICD-10-CM

## 2021-12-13 PROCEDURE — 73521 X-RAY EXAM HIPS BI 2 VIEWS: CPT

## 2021-12-13 PROCEDURE — 73562 X-RAY EXAM OF KNEE 3: CPT

## 2021-12-13 PROCEDURE — 73521 X-RAY EXAM HIPS BI 2 VIEWS: CPT | Mod: 26

## 2021-12-13 PROCEDURE — 73562 X-RAY EXAM OF KNEE 3: CPT | Mod: 26,50

## 2021-12-15 ENCOUNTER — APPOINTMENT (OUTPATIENT)
Dept: RADIATION ONCOLOGY | Facility: CLINIC | Age: 81
End: 2021-12-15
Payer: MEDICARE

## 2021-12-15 LAB — PSA SERPL-MCNC: 0.13 NG/ML

## 2021-12-15 PROCEDURE — 99213 OFFICE O/P EST LOW 20 MIN: CPT | Mod: 95

## 2021-12-15 NOTE — REASON FOR VISIT
[Routine Follow-Up] : routine follow-up visit for [Prostate Cancer] : prostate cancer [Home] : at home, [unfilled] , at the time of the visit. [Medical Office: ([facility])___] : at the medical office located in  [Verbal consent obtained from patient] : the patient, [unfilled]

## 2021-12-21 NOTE — HISTORY OF PRESENT ILLNESS
[FreeTextEntry1] : 78 year old man with [id number] adenocarcinoma of the prostate, Sonya 4+3, pre treatment PSA 6.32 ng/mL. He is s/p hypofractionated radiation to 70 Gy in 28 fractions completed on 1/14/19 with Lupron (1st 10/2018). He presents for F/U. \par \par He feels well without significant urinary issues, nocturia 0-1 months. On Vesicare from urologist. \par \par PSA Trend \par 08/2019 - 6.32\par 05/2019 - <0.01\par 4/22/20 - 0.07\par 10/28/20 - 0.08\par 3/05/21 - 0 .11\par 5/06/21 - 0.13\par 12/2021 -- 0.13

## 2021-12-28 ENCOUNTER — APPOINTMENT (OUTPATIENT)
Dept: RHEUMATOLOGY | Facility: CLINIC | Age: 81
End: 2021-12-28

## 2022-01-05 NOTE — ASSESSMENT
PATIENT IS BEING SEEN FOR WEAKNESS AND IMPAIRED MOBILITY DUE TO ONGOING ILLNESS, COPD, MULTIPLE INFECTIONS, ETC.  SHE HAS BEEN ABLE TO PROGRESS TO WALKING WITH WALKER DURING TREATMENTS WITH MOD TO MAX ASSIST.  PATIENT WAS ABLE TO AMBULATE WITH R/W 18 FT AND 4 ATTEMPTS AT 3 FT WITH MAX ASSIST, POOR GAIT MECH AND INSTABILITY.  PATIENT MUCH MORE ALERT AND EAGER TO TRY TO PARTICIPATE TODAY [No evidence of disease] : No evidence of disease

## 2022-01-24 ENCOUNTER — APPOINTMENT (OUTPATIENT)
Dept: UROLOGY | Facility: CLINIC | Age: 82
End: 2022-01-24
Payer: MEDICARE

## 2022-01-24 PROCEDURE — 99212 OFFICE O/P EST SF 10 MIN: CPT

## 2022-01-24 RX ORDER — SOLIFENACIN SUCCINATE 5 MG/1
5 TABLET ORAL DAILY
Qty: 90 | Refills: 3 | Status: ACTIVE | COMMUNITY
Start: 2022-01-24 | End: 1900-01-01

## 2022-01-24 NOTE — PHYSICAL EXAM
[General Appearance - Well Developed] : well developed [General Appearance - Well Nourished] : well nourished [Normal Appearance] : normal appearance [Well Groomed] : well groomed [General Appearance - In No Acute Distress] : no acute distress [Abdomen Soft] : soft [Abdomen Tenderness] : non-tender [Edema] : no peripheral edema [] : no respiratory distress [Respiration, Rhythm And Depth] : normal respiratory rhythm and effort [Exaggerated Use Of Accessory Muscles For Inspiration] : no accessory muscle use [Oriented To Time, Place, And Person] : oriented to person, place, and time [Affect] : the affect was normal [Mood] : the mood was normal [Not Anxious] : not anxious [Normal Station and Gait] : the gait and station were normal for the patient's age [No Focal Deficits] : no focal deficits [No Palpable Adenopathy] : no palpable adenopathy [FreeTextEntry1] : The digital rectal exam showed a nonpalpable prostate. No nodules present.

## 2022-01-24 NOTE — HISTORY OF PRESENT ILLNESS
[FreeTextEntry1] : The patient is a 81 year old male presenting today for a follow up visit for CaP. \par His PSA on 12/8/21 was 0.13 ng/mL. \par He is feeling well today. \par Denies nocturia. \par Denies any urinary incontinence. \par He currently takes VESIcare 10 mg, one tablet daily. \par \par The digital rectal exam showed a nonpalpable prostate. No nodules present. \par \par I renewed his prescription for VESIcare. \par In 5 months, he will repeat blood work to measure his PSA. \par \par The patient will return to the office in 6 months for a follow up visit with Dr. Cristina.

## 2022-01-24 NOTE — ASSESSMENT
[FreeTextEntry1] : The patient is a 81 year old male presenting today for a follow up visit for CaP. \par His PSA on 12/8/21 was 0.13 ng/mL. \par He is feeling well today. \par Denies nocturia. \par Denies any urinary incontinence. \par He currently takes VESIcare 10 mg, one tablet daily. \par \par The digital rectal exam showed a nonpalpable prostate. No nodules present. \par \par I renewed his prescription for VESIcare. \par In 5 months, he will repeat blood work to measure his PSA. \par \par The patient will return to the office in 6 months for a follow up visit with Dr. Cristina.\par \par I spent 15 minutes with the patient.

## 2022-03-22 ENCOUNTER — APPOINTMENT (OUTPATIENT)
Dept: DERMATOLOGY | Facility: CLINIC | Age: 82
End: 2022-03-22
Payer: MEDICARE

## 2022-03-22 VITALS — BODY MASS INDEX: 28.44 KG/M2 | HEIGHT: 72 IN | WEIGHT: 210 LBS

## 2022-03-22 PROCEDURE — 99214 OFFICE O/P EST MOD 30 MIN: CPT

## 2022-03-22 RX ORDER — FOLIC ACID 1 MG/1
1 TABLET ORAL DAILY
Qty: 30 | Refills: 4 | Status: ACTIVE | COMMUNITY
Start: 2019-03-07 | End: 1900-01-01

## 2022-03-23 RX ORDER — METHOTREXATE 2.5 MG/1
2.5 TABLET ORAL
Qty: 24 | Refills: 0 | Status: ACTIVE | COMMUNITY
Start: 2019-03-07 | End: 1900-01-01

## 2022-03-29 ENCOUNTER — NON-APPOINTMENT (OUTPATIENT)
Age: 82
End: 2022-03-29

## 2022-03-31 ENCOUNTER — APPOINTMENT (OUTPATIENT)
Dept: DERMATOLOGY | Facility: CLINIC | Age: 82
End: 2022-03-31
Payer: MEDICARE

## 2022-03-31 LAB
ALBUMIN SERPL ELPH-MCNC: 4.7 G/DL
ALP BLD-CCNC: 72 U/L
ALT SERPL-CCNC: 22 U/L
ANION GAP SERPL CALC-SCNC: 12 MMOL/L
AST SERPL-CCNC: 21 U/L
BASOPHILS # BLD AUTO: 0.04 K/UL
BASOPHILS NFR BLD AUTO: 0.4 %
BILIRUB SERPL-MCNC: 0.9 MG/DL
BUN SERPL-MCNC: 22 MG/DL
CALCIUM SERPL-MCNC: 9.3 MG/DL
CHLORIDE SERPL-SCNC: 104 MMOL/L
CO2 SERPL-SCNC: 24 MMOL/L
CREAT SERPL-MCNC: 1.02 MG/DL
EGFR: 75 ML/MIN/1.73M2
EOSINOPHIL # BLD AUTO: 0.2 K/UL
EOSINOPHIL NFR BLD AUTO: 2 %
GLUCOSE SERPL-MCNC: 167 MG/DL
HBV CORE IGG+IGM SER QL: NONREACTIVE
HBV CORE IGM SER QL: NONREACTIVE
HBV E AG SER QL: NONREACTIVE
HBV SURFACE AB SER QL: NONREACTIVE
HBV SURFACE AG SER QL: NONREACTIVE
HCT VFR BLD CALC: 51.4 %
HCV AB SER QL: NONREACTIVE
HCV S/CO RATIO: 0.16 S/CO
HGB BLD-MCNC: 17.3 G/DL
IMM GRANULOCYTES NFR BLD AUTO: 0.7 %
LYMPHOCYTES # BLD AUTO: 1.01 K/UL
LYMPHOCYTES NFR BLD AUTO: 9.9 %
M TB IFN-G BLD-IMP: NEGATIVE
MAN DIFF?: NORMAL
MCHC RBC-ENTMCNC: 33.5 PG
MCHC RBC-ENTMCNC: 33.7 GM/DL
MCV RBC AUTO: 99.4 FL
MONOCYTES # BLD AUTO: 0.74 K/UL
MONOCYTES NFR BLD AUTO: 7.3 %
NEUTROPHILS # BLD AUTO: 8.12 K/UL
NEUTROPHILS NFR BLD AUTO: 79.7 %
PLATELET # BLD AUTO: 202 K/UL
POTASSIUM SERPL-SCNC: 4.4 MMOL/L
PROT SERPL-MCNC: 6.8 G/DL
QUANTIFERON TB PLUS MITOGEN MINUS NIL: 1.8 IU/ML
QUANTIFERON TB PLUS NIL: 0 IU/ML
QUANTIFERON TB PLUS TB1 MINUS NIL: 0 IU/ML
QUANTIFERON TB PLUS TB2 MINUS NIL: 0 IU/ML
RBC # BLD: 5.17 M/UL
RBC # FLD: 13.5 %
SODIUM SERPL-SCNC: 140 MMOL/L
WBC # FLD AUTO: 10.18 K/UL

## 2022-03-31 PROCEDURE — 99214 OFFICE O/P EST MOD 30 MIN: CPT

## 2022-04-26 ENCOUNTER — APPOINTMENT (OUTPATIENT)
Dept: DERMATOLOGY | Facility: CLINIC | Age: 82
End: 2022-04-26
Payer: MEDICARE

## 2022-04-26 PROCEDURE — 96401 CHEMO ANTI-NEOPL SQ/IM: CPT

## 2022-04-26 PROCEDURE — 99214 OFFICE O/P EST MOD 30 MIN: CPT | Mod: 25

## 2022-05-12 ENCOUNTER — APPOINTMENT (OUTPATIENT)
Dept: DERMATOLOGY | Facility: CLINIC | Age: 82
End: 2022-05-12
Payer: MEDICARE

## 2022-05-12 VITALS — BODY MASS INDEX: 28.44 KG/M2 | HEIGHT: 72 IN | WEIGHT: 210 LBS

## 2022-05-12 PROCEDURE — 99214 OFFICE O/P EST MOD 30 MIN: CPT | Mod: 25

## 2022-05-12 PROCEDURE — 96401 CHEMO ANTI-NEOPL SQ/IM: CPT

## 2022-05-12 NOTE — HISTORY OF PRESENT ILLNESS
[de-identified] : 80 y/o M h/o prostate cancer in remission here for f/u:\par \par Psoriasis on the trunk/ext. previously on MTX 15mg/wk + FA (started Jan 2020 - Dec 2020, restarted 11/21-present ), LV Nov 2021.\par - notes persistent plaques on lower and upper extremities, occasionally using clobetasol ointment daily to areas with improvement. Here today for cosentyx injection training \par \par switched to cosentyx\par took 1 dose, says he is not receiving subsequent doses\par \par Other derm hx: AKs \par PMH: T2DM, no h/o skin cancer\par FH: no fhx of skin cancer or psoriasis\par SH: drinks a glass of wine nightly, rarely uses sunscreen \par

## 2022-05-12 NOTE — ASSESSMENT
[FreeTextEntry1] : 1) Plaque PsO, mod/sev initially, overall improved, ~5% BSA\par Insufficient response to MTX (Jan 2020-Dec 2020, Nov 2021-March 2022) \par Prior tx includes excimer, phototherapy, otezla, clobetasol\par \par I injected cosentyx today\par pt to self admin doses hereafter\par Cont clobetasol to thick plaques, 2w on1 w off, SED \par \par 2) High risk medication use\par - 3/22/2022 - stable CBC/CMP. HBV and HCV, Quant Gold labs wnl\par \par RTC 4 months. \par

## 2022-06-27 ENCOUNTER — APPOINTMENT (OUTPATIENT)
Dept: UROLOGY | Facility: CLINIC | Age: 82
End: 2022-06-27

## 2022-06-27 DIAGNOSIS — C61 MALIGNANT NEOPLASM OF PROSTATE: ICD-10-CM

## 2022-06-27 DIAGNOSIS — N40.1 BENIGN PROSTATIC HYPERPLASIA WITH LOWER URINARY TRACT SYMPMS: ICD-10-CM

## 2022-06-27 DIAGNOSIS — N13.8 BENIGN PROSTATIC HYPERPLASIA WITH LOWER URINARY TRACT SYMPMS: ICD-10-CM

## 2022-06-27 DIAGNOSIS — N52.9 MALE ERECTILE DYSFUNCTION, UNSPECIFIED: ICD-10-CM

## 2022-06-27 PROCEDURE — 99213 OFFICE O/P EST LOW 20 MIN: CPT

## 2022-06-27 RX ORDER — SILDENAFIL 100 MG/1
100 TABLET, FILM COATED ORAL
Qty: 10 | Refills: 6 | Status: ACTIVE | COMMUNITY
Start: 2022-06-27 | End: 1900-01-01

## 2022-06-27 RX ORDER — TADALAFIL 20 MG/1
20 TABLET ORAL
Qty: 6 | Refills: 3 | Status: ACTIVE | COMMUNITY
Start: 2022-06-27 | End: 1900-01-01

## 2022-07-04 ENCOUNTER — TRANSCRIPTION ENCOUNTER (OUTPATIENT)
Age: 82
End: 2022-07-04

## 2022-07-04 LAB — PSA SERPL-MCNC: 0.23 NG/ML

## 2022-07-05 ENCOUNTER — RESULT REVIEW (OUTPATIENT)
Age: 82
End: 2022-07-05

## 2022-07-07 RX ORDER — SECUKINUMAB 150 MG/ML
150 INJECTION SUBCUTANEOUS
Qty: 1 | Refills: 2 | Status: ACTIVE | OUTPATIENT
Start: 2022-03-22

## 2022-07-18 ENCOUNTER — APPOINTMENT (OUTPATIENT)
Dept: NUCLEAR MEDICINE | Facility: IMAGING CENTER | Age: 82
End: 2022-07-18

## 2022-07-18 ENCOUNTER — OUTPATIENT (OUTPATIENT)
Dept: OUTPATIENT SERVICES | Facility: HOSPITAL | Age: 82
LOS: 1 days | End: 2022-07-18
Payer: MEDICARE

## 2022-07-18 DIAGNOSIS — C61 MALIGNANT NEOPLASM OF PROSTATE: ICD-10-CM

## 2022-07-18 PROCEDURE — 78816 PET IMAGE W/CT FULL BODY: CPT | Mod: 26,MH

## 2022-07-18 PROCEDURE — 78816 PET IMAGE W/CT FULL BODY: CPT

## 2022-07-18 PROCEDURE — A9595: CPT

## 2022-07-25 ENCOUNTER — OUTPATIENT (OUTPATIENT)
Dept: OUTPATIENT SERVICES | Facility: HOSPITAL | Age: 82
LOS: 1 days | End: 2022-07-25

## 2022-07-25 VITALS
SYSTOLIC BLOOD PRESSURE: 131 MMHG | DIASTOLIC BLOOD PRESSURE: 87 MMHG | TEMPERATURE: 97 F | RESPIRATION RATE: 16 BRPM | OXYGEN SATURATION: 97 % | HEART RATE: 68 BPM | HEIGHT: 72 IN | WEIGHT: 214.07 LBS

## 2022-07-25 DIAGNOSIS — Z91.19 PATIENT'S NONCOMPLIANCE WITH OTHER MEDICAL TREATMENT AND REGIMEN: ICD-10-CM

## 2022-07-25 DIAGNOSIS — E11.9 TYPE 2 DIABETES MELLITUS WITHOUT COMPLICATIONS: ICD-10-CM

## 2022-07-25 DIAGNOSIS — M79.644 PAIN IN RIGHT FINGER(S): ICD-10-CM

## 2022-07-25 DIAGNOSIS — M72.0 PALMAR FASCIAL FIBROMATOSIS [DUPUYTREN]: ICD-10-CM

## 2022-07-25 DIAGNOSIS — Z95.1 PRESENCE OF AORTOCORONARY BYPASS GRAFT: Chronic | ICD-10-CM

## 2022-07-25 LAB
A1C WITH ESTIMATED AVERAGE GLUCOSE RESULT: 6.1 % — HIGH (ref 4–5.6)
ALBUMIN SERPL ELPH-MCNC: 4.3 G/DL — SIGNIFICANT CHANGE UP (ref 3.3–5)
ALP SERPL-CCNC: 67 U/L — SIGNIFICANT CHANGE UP (ref 40–120)
ALT FLD-CCNC: 20 U/L — SIGNIFICANT CHANGE UP (ref 4–41)
ANION GAP SERPL CALC-SCNC: 11 MMOL/L — SIGNIFICANT CHANGE UP (ref 7–14)
AST SERPL-CCNC: 25 U/L — SIGNIFICANT CHANGE UP (ref 4–40)
BILIRUB SERPL-MCNC: 1.1 MG/DL — SIGNIFICANT CHANGE UP (ref 0.2–1.2)
BUN SERPL-MCNC: 17 MG/DL — SIGNIFICANT CHANGE UP (ref 7–23)
CALCIUM SERPL-MCNC: 9.6 MG/DL — SIGNIFICANT CHANGE UP (ref 8.4–10.5)
CHLORIDE SERPL-SCNC: 104 MMOL/L — SIGNIFICANT CHANGE UP (ref 98–107)
CO2 SERPL-SCNC: 27 MMOL/L — SIGNIFICANT CHANGE UP (ref 22–31)
CREAT SERPL-MCNC: 0.87 MG/DL — SIGNIFICANT CHANGE UP (ref 0.5–1.3)
EGFR: 88 ML/MIN/1.73M2 — SIGNIFICANT CHANGE UP
ESTIMATED AVERAGE GLUCOSE: 128 — SIGNIFICANT CHANGE UP
GLUCOSE SERPL-MCNC: 102 MG/DL — HIGH (ref 70–99)
HCT VFR BLD CALC: 50 % — SIGNIFICANT CHANGE UP (ref 39–50)
HGB BLD-MCNC: 17.8 G/DL — HIGH (ref 13–17)
MCHC RBC-ENTMCNC: 33.7 PG — SIGNIFICANT CHANGE UP (ref 27–34)
MCHC RBC-ENTMCNC: 35.6 GM/DL — SIGNIFICANT CHANGE UP (ref 32–36)
MCV RBC AUTO: 94.7 FL — SIGNIFICANT CHANGE UP (ref 80–100)
NRBC # BLD: 0 /100 WBCS — SIGNIFICANT CHANGE UP
NRBC # FLD: 0 K/UL — SIGNIFICANT CHANGE UP
PLATELET # BLD AUTO: 182 K/UL — SIGNIFICANT CHANGE UP (ref 150–400)
POTASSIUM SERPL-MCNC: 3.9 MMOL/L — SIGNIFICANT CHANGE UP (ref 3.5–5.3)
POTASSIUM SERPL-SCNC: 3.9 MMOL/L — SIGNIFICANT CHANGE UP (ref 3.5–5.3)
PROT SERPL-MCNC: 6.5 G/DL — SIGNIFICANT CHANGE UP (ref 6–8.3)
RBC # BLD: 5.28 M/UL — SIGNIFICANT CHANGE UP (ref 4.2–5.8)
RBC # FLD: 13 % — SIGNIFICANT CHANGE UP (ref 10.3–14.5)
SODIUM SERPL-SCNC: 142 MMOL/L — SIGNIFICANT CHANGE UP (ref 135–145)
WBC # BLD: 6.51 K/UL — SIGNIFICANT CHANGE UP (ref 3.8–10.5)
WBC # FLD AUTO: 6.51 K/UL — SIGNIFICANT CHANGE UP (ref 3.8–10.5)

## 2022-07-25 PROCEDURE — 93010 ELECTROCARDIOGRAM REPORT: CPT

## 2022-07-25 NOTE — H&P PST ADULT - NSICDXPASTMEDICALHX_GEN_ALL_CORE_FT
PAST MEDICAL HISTORY:  Dupuytren's disease of finger right    HTN (hypertension)     Hyperlipidemia     Obese     Type II diabetes mellitus

## 2022-07-25 NOTE — H&P PST ADULT - PROBLEM SELECTOR PLAN 1
Pt. is scheduled for a right little finger dupuytren's cord excision and possible joint release 8/1/22.  Pt. verbalized understanding of instructions and that Chlorhexidine is for external use.

## 2022-07-25 NOTE — H&P PST ADULT - MUSCULOSKELETAL
details… right little finger does not bend/normal/ROM intact/normal gait/strength 5/5 bilateral upper extremities/strength 5/5 bilateral lower extremities

## 2022-07-25 NOTE — H&P PST ADULT - LAST CARDIAC ANGIOGRAM/IMAGING
"they may have done that when I went for the triple bypass", "[facility]."

## 2022-08-14 NOTE — PHYSICAL EXAM
[General Appearance - Well Developed] : well developed [General Appearance - Well Nourished] : well nourished [Bowel Sounds] : normal bowel sounds [Abdomen Soft] : soft [Skin Color & Pigmentation] : normal skin color and pigmentation [Skin Turgor] : supple [Heart Rate And Rhythm] : Heart rate and rhythm were normal [] : no respiratory distress [Respiration, Rhythm And Depth] : normal respiratory rhythm and effort [Oriented To Time, Place, And Person] : oriented to person, place, and time [Not Anxious] : not anxious [Normal Station and Gait] : the gait and station were normal for the patient's age [No Focal Deficits] : no focal deficits [FreeTextEntry1] : RAYMOND in Jan with nonpalpable prostate 1/2022

## 2022-08-14 NOTE — HISTORY OF PRESENT ILLNESS
[FreeTextEntry1] : : Dec 14 1940 \par Referring Provider: none \par \par HPI: Mr. DUYEN JAMES is a 81 year yo M with a PMHx notable [id number] adenocarcinoma of the prostate, [place] 4+3, pre treatment PSA 6.32 ng/mL. He is s/p hypofractionated radiation to 70 Gy in 28 fractions completed on 19 with Lupron (1st 10/2018). He presents for F/U. His last PSA was 2021 was 0.13 ng/mL. Here today on solifenacin with good results. Nocturia x1, has sensation that he completely emptying. \par \par Here today for follow up. Has difficulty with erections. He has not tried to have erections, willing to try PDE5i. \par \par Anticoagulation: None\par All: NKDA\par Social: nonsmoker and nondrinker, \par PMHx:CAD s/p CABG, CaP s/p XRT, psoriasis\par FHx: Father with leukemia\par PSHx: None\par Labs: PSA 0.13 in 2021\par  [Urinary Incontinence] : no urinary incontinence [Urinary Retention] : no urinary retention [Urinary Urgency] : no urinary urgency [Urinary Frequency] : no urinary frequency

## 2022-08-15 ENCOUNTER — NON-APPOINTMENT (OUTPATIENT)
Age: 82
End: 2022-08-15

## 2022-08-16 ENCOUNTER — APPOINTMENT (OUTPATIENT)
Dept: DERMATOLOGY | Facility: CLINIC | Age: 82
End: 2022-08-16

## 2022-08-16 VITALS — BODY MASS INDEX: 28.44 KG/M2 | HEIGHT: 72 IN | WEIGHT: 210 LBS

## 2022-08-16 DIAGNOSIS — T23.009A BURN OF UNSPECIFIED DEGREE OF UNSPECIFIED HAND, UNSPECIFIED SITE, INITIAL ENCOUNTER: ICD-10-CM

## 2022-08-16 PROCEDURE — 99214 OFFICE O/P EST MOD 30 MIN: CPT

## 2022-08-17 PROBLEM — E11.9 TYPE 2 DIABETES MELLITUS WITHOUT COMPLICATIONS: Chronic | Status: ACTIVE | Noted: 2022-07-25

## 2022-08-17 PROBLEM — M72.0 PALMAR FASCIAL FIBROMATOSIS [DUPUYTREN]: Chronic | Status: ACTIVE | Noted: 2022-07-25

## 2022-08-17 PROBLEM — E78.5 HYPERLIPIDEMIA, UNSPECIFIED: Chronic | Status: ACTIVE | Noted: 2022-07-25

## 2022-08-17 PROBLEM — I10 ESSENTIAL (PRIMARY) HYPERTENSION: Chronic | Status: ACTIVE | Noted: 2022-07-25

## 2022-08-17 PROBLEM — E66.9 OBESITY, UNSPECIFIED: Chronic | Status: ACTIVE | Noted: 2022-07-25

## 2022-08-20 ENCOUNTER — TRANSCRIPTION ENCOUNTER (OUTPATIENT)
Age: 82
End: 2022-08-20

## 2022-10-13 ENCOUNTER — NON-APPOINTMENT (OUTPATIENT)
Age: 82
End: 2022-10-13

## 2022-11-24 ENCOUNTER — NON-APPOINTMENT (OUTPATIENT)
Age: 82
End: 2022-11-24

## 2022-11-29 ENCOUNTER — APPOINTMENT (OUTPATIENT)
Dept: DERMATOLOGY | Facility: CLINIC | Age: 82
End: 2022-11-29

## 2022-11-29 DIAGNOSIS — L57.0 ACTINIC KERATOSIS: ICD-10-CM

## 2022-11-29 PROCEDURE — 99214 OFFICE O/P EST MOD 30 MIN: CPT | Mod: 25

## 2022-11-29 PROCEDURE — 17000 DESTRUCT PREMALG LESION: CPT

## 2022-12-14 ENCOUNTER — APPOINTMENT (OUTPATIENT)
Dept: RADIATION ONCOLOGY | Facility: CLINIC | Age: 82
End: 2022-12-14

## 2022-12-14 PROCEDURE — 99442: CPT | Mod: 95

## 2022-12-14 NOTE — REASON FOR VISIT
[Routine Follow-Up] : routine follow-up visit for [Prostate Cancer] : prostate cancer [Home] : at home, [unfilled] , at the time of the visit. [Medical Office: ([facility])___] : at the medical office located in  [Verbal consent obtained from patient] : the patient, [unfilled]

## 2022-12-18 NOTE — HISTORY OF PRESENT ILLNESS
[FreeTextEntry1] : 82 year old man with [id number] adenocarcinoma of the prostate, Sonya 4+3, pre treatment PSA 6.32 ng/mL. He is s/p hypofractionated radiation to 70 Gy in 28 fractions completed on 1/14/19 with Lupron (1st 10/2018). He presents for F/U. \par \par He feels well without significant urinary issues, nocturia 0-1 months. On Vesicare from urologist. Advised to have PSA test when he sees urologist next week.\par \par PSA Trend \par 08/2019 - 6.32\par 05/2019 - <0.01\par 4/22/20 - 0.07\par 10/28/20 - 0.08\par 3/05/21 - 0 .11\par 5/06/21 - 0.13\par 12/2021 -- 0.13\par 6/2022 -- 0.23\par

## 2022-12-18 NOTE — REVIEW OF SYSTEMS
[IPSS Score (0-40): ___] : IPSS score: [unfilled] [EPIC-CP Score (0-60): ___] : EPIC-CP score: [unfilled] [Negative] : Allergic/Immunologic [Constipation: Grade 1 - Occasional or intermittent symptoms; occasional use of stool softeners, laxatives, dietary modification, or enema] : Constipation: Grade 1 - Occasional or intermittent symptoms; occasional use of stool softeners, laxatives, dietary modification, or enema [Diarrhea: Grade 0] : Diarrhea: Grade 0 [Nausea: Grade 0] : Nausea: Grade 0 [Proctitis: Grade 0] : Proctitis: Grade 0 [Hematuria: Grade 0] : Hematuria: Grade 0 [Urinary Incontinence: Grade 0] : Urinary Incontinence: Grade 0  [Urinary Retention: Grade 1 - Urinary, suprapubic or intermittent catheter placement not indicated; able to void with some residual] : Urinary Retention: Grade 1 - Urinary, suprapubic or intermittent catheter placement not indicated; able to void with some residual [Urinary Tract Pain: Grade 0] : Urinary Tract Pain: Grade 0 [Urinary Urgency: Grade 0] : Urinary Urgency: Grade 0 [Urinary Frequency: Grade 1 - Present] : Urinary Frequency: Grade 1 - Present [Ejaculation Disorder: Grade 1 - Diminished ejaculation] : Ejaculation Disorder: Grade 1 - Diminished ejaculation  [Erectile Dysfunction: Grade 2 - Decrease in erectile function (frequency/rigidity of erections), erectile intervention indicated, (e.g., medication or mechanical devices such as penile pump)] : Erectile Dysfunction: Grade 2 - Decrease in erectile function (frequency/rigidity of erections), erectile intervention indicated, (e.g., medication or mechanical devices such as penile pump) [FreeTextEntry8] : h/o radiation to prostate cancer 1/2019

## 2022-12-22 ENCOUNTER — APPOINTMENT (OUTPATIENT)
Dept: UROLOGY | Facility: CLINIC | Age: 82
End: 2022-12-22

## 2023-01-17 LAB — PSA SERPL-MCNC: 0.14 NG/ML

## 2023-01-18 ENCOUNTER — APPOINTMENT (OUTPATIENT)
Dept: RADIATION ONCOLOGY | Facility: CLINIC | Age: 83
End: 2023-01-18
Payer: MEDICARE

## 2023-01-18 PROCEDURE — 99443: CPT | Mod: 95

## 2023-01-18 RX ORDER — SOLIFENACIN SUCCINATE 10 MG/1
10 TABLET ORAL DAILY
Qty: 90 | Refills: 0 | Status: ACTIVE | COMMUNITY
Start: 2021-08-02 | End: 1900-01-01

## 2023-01-18 NOTE — REASON FOR VISIT
[Routine Follow-Up] : routine follow-up visit for [Prostate Cancer] : prostate cancer [Home] : at home, [unfilled] , at the time of the visit. [Medical Office: ([facility])___] : at the medical office located in  [Verbal consent obtained from patient] : the patient, [unfilled]

## 2023-01-22 NOTE — HISTORY OF PRESENT ILLNESS
[FreeTextEntry1] : 82 year old man with [id number] adenocarcinoma of the prostate, Sonya 4+3, pre treatment PSA 6.32 ng/mL. He is s/p hypofractionated radiation to 70 Gy in 28 fractions completed on 1/14/19 with Lupron (1st 10/2018). He presents for F/U. \par \par 1/18/23 F/U. On Vesicare from urologist. Rx refill given as per Pt's request. Also c/o constipation which could be side effect of Vesicare.  Advised to have PSA test when he sees urologist.\par \par PSA Trend \par 08/2019 - 6.32\par 05/2019 - <0.01\par 4/22/20 - 0.07\par 10/28/20 - 0.08\par 3/05/21 - 0 .11\par 5/06/21 - 0.13\par 12/2021 -- 0.13\par 6/2022 -- 0.23\par 1/2023 -- PSA 0.14\par

## 2023-02-06 RX ORDER — SECUKINUMAB 150 MG/ML
150 INJECTION SUBCUTANEOUS
Qty: 5 | Refills: 0 | Status: ACTIVE | COMMUNITY
Start: 2022-03-23

## 2023-03-14 ENCOUNTER — APPOINTMENT (OUTPATIENT)
Dept: DERMATOLOGY | Facility: CLINIC | Age: 83
End: 2023-03-14
Payer: MEDICARE

## 2023-03-14 VITALS — HEIGHT: 72 IN | WEIGHT: 210 LBS | BODY MASS INDEX: 28.44 KG/M2

## 2023-03-14 PROCEDURE — 99214 OFFICE O/P EST MOD 30 MIN: CPT

## 2023-03-21 LAB
ALBUMIN SERPL ELPH-MCNC: 4.5 G/DL
ALP BLD-CCNC: 65 U/L
ALT SERPL-CCNC: 21 U/L
ANION GAP SERPL CALC-SCNC: 11 MMOL/L
AST SERPL-CCNC: 23 U/L
BASOPHILS # BLD AUTO: 0.04 K/UL
BASOPHILS NFR BLD AUTO: 0.5 %
BILIRUB SERPL-MCNC: 1 MG/DL
BUN SERPL-MCNC: 27 MG/DL
CALCIUM SERPL-MCNC: 9.5 MG/DL
CHLORIDE SERPL-SCNC: 104 MMOL/L
CO2 SERPL-SCNC: 24 MMOL/L
CREAT SERPL-MCNC: 1.14 MG/DL
EGFR: 65 ML/MIN/1.73M2
EOSINOPHIL # BLD AUTO: 0.33 K/UL
EOSINOPHIL NFR BLD AUTO: 4.5 %
HCT VFR BLD CALC: 48.8 %
HGB BLD-MCNC: 17.2 G/DL
IMM GRANULOCYTES NFR BLD AUTO: 0.5 %
LYMPHOCYTES # BLD AUTO: 1 K/UL
LYMPHOCYTES NFR BLD AUTO: 13.6 %
M TB IFN-G BLD-IMP: NEGATIVE
MAN DIFF?: NORMAL
MCHC RBC-ENTMCNC: 33.5 PG
MCHC RBC-ENTMCNC: 35.2 GM/DL
MCV RBC AUTO: 95.1 FL
MONOCYTES # BLD AUTO: 0.66 K/UL
MONOCYTES NFR BLD AUTO: 8.9 %
NEUTROPHILS # BLD AUTO: 5.31 K/UL
NEUTROPHILS NFR BLD AUTO: 72 %
PLATELET # BLD AUTO: 185 K/UL
POTASSIUM SERPL-SCNC: 4.4 MMOL/L
PROT SERPL-MCNC: 7.1 G/DL
QUANTIFERON TB PLUS MITOGEN MINUS NIL: 4.81 IU/ML
QUANTIFERON TB PLUS NIL: 0.02 IU/ML
QUANTIFERON TB PLUS TB1 MINUS NIL: 0 IU/ML
QUANTIFERON TB PLUS TB2 MINUS NIL: 0 IU/ML
RBC # BLD: 5.13 M/UL
RBC # FLD: 13.1 %
SODIUM SERPL-SCNC: 140 MMOL/L
WBC # FLD AUTO: 7.38 K/UL

## 2023-05-23 ENCOUNTER — APPOINTMENT (OUTPATIENT)
Dept: DERMATOLOGY | Facility: CLINIC | Age: 83
End: 2023-05-23
Payer: MEDICARE

## 2023-05-23 PROCEDURE — 99214 OFFICE O/P EST MOD 30 MIN: CPT | Mod: 25

## 2023-05-23 PROCEDURE — 17110 DESTRUCTION B9 LES UP TO 14: CPT

## 2023-05-30 ENCOUNTER — APPOINTMENT (OUTPATIENT)
Dept: DERMATOLOGY | Facility: CLINIC | Age: 83
End: 2023-05-30
Payer: MEDICARE

## 2023-05-30 DIAGNOSIS — L82.0 INFLAMED SEBORRHEIC KERATOSIS: ICD-10-CM

## 2023-05-30 PROCEDURE — 99214 OFFICE O/P EST MOD 30 MIN: CPT | Mod: 24

## 2023-06-08 ENCOUNTER — NON-APPOINTMENT (OUTPATIENT)
Age: 83
End: 2023-06-08

## 2023-07-17 ENCOUNTER — APPOINTMENT (OUTPATIENT)
Dept: ENDOCRINOLOGY | Facility: CLINIC | Age: 83
End: 2023-07-17
Payer: MEDICARE

## 2023-07-17 VITALS
HEART RATE: 61 BPM | SYSTOLIC BLOOD PRESSURE: 118 MMHG | WEIGHT: 210 LBS | DIASTOLIC BLOOD PRESSURE: 80 MMHG | BODY MASS INDEX: 28.48 KG/M2 | OXYGEN SATURATION: 96 %

## 2023-07-17 DIAGNOSIS — E04.2 NONTOXIC MULTINODULAR GOITER: ICD-10-CM

## 2023-07-17 PROCEDURE — 99205 OFFICE O/P NEW HI 60 MIN: CPT

## 2023-07-17 NOTE — HISTORY OF PRESENT ILLNESS
[FreeTextEntry1] : #MNG\par \par Diagnosis: May 2023\par \par Last US date: 5/2023\par Right lobe:  4.7 x 1.4 x 1.6cm            \par Nodule #1 0.9 x 0.9 x 0.9 TR 4 nodule     \par Nodule #2 1.0 x 0.6 x 0.8cm TR3 nodule  \par \par Left lobe: 4.3 x 1.6 x 1.7cm\par Nodule #1 1.4 x 1.5 x 1.2 cm TR 4 nodule\par Nodule #2 1.1 x 0.8 x 0.9cm TR4 nodule\par \par Isthmus: 0.9cm \par \par Cervical LNs: no abnormal lymph nodes \par \par FNA in the past: Denies\par FH: Denies\par History of radiation: Yes to the prostate.  No other radiation to the neck\par Compressive symptoms: Denies any difficulty breathing, drinking or swallowing food \par \par Labs:\par No labs in the system\par

## 2023-07-17 NOTE — ASSESSMENT
[FreeTextEntry1] : #MNG\par -Patient recently found to have bilateral thyroid nodules.\par -Ultrasound shows several different nodules with the largest one measuring 1.5 cm on the left described as TI-RADS 4 which does meet criteria for biopsy\par -Denies any compressive symptoms\par Plan:\par -We will get TFTs today to rule out hot nodule.  Patient does not show any clinical signs of hyperthyroidism\par -We will refer patient for FNA if TFTs are stable

## 2023-07-18 ENCOUNTER — NON-APPOINTMENT (OUTPATIENT)
Age: 83
End: 2023-07-18

## 2023-07-18 LAB
ANION GAP SERPL CALC-SCNC: 13 MMOL/L
BUN SERPL-MCNC: 18 MG/DL
CALCIUM SERPL-MCNC: 9.7 MG/DL
CHLORIDE SERPL-SCNC: 107 MMOL/L
CO2 SERPL-SCNC: 22 MMOL/L
CREAT SERPL-MCNC: 1.01 MG/DL
EGFR: 75 ML/MIN/1.73M2
GLUCOSE SERPL-MCNC: 124 MG/DL
POTASSIUM SERPL-SCNC: 4.6 MMOL/L
SODIUM SERPL-SCNC: 142 MMOL/L
TSH SERPL-ACNC: 2.97 UIU/ML

## 2023-07-21 ENCOUNTER — APPOINTMENT (OUTPATIENT)
Dept: RADIATION ONCOLOGY | Facility: CLINIC | Age: 83
End: 2023-07-21
Payer: MEDICARE

## 2023-07-21 PROCEDURE — 99447 NTRPROF PH1/NTRNET/EHR 11-20: CPT

## 2023-07-21 NOTE — REASON FOR VISIT
[Routine Follow-Up] : routine follow-up visit for [Prostate Cancer] : prostate cancer [Home] : at home, [unfilled] , at the time of the visit. [Medical Office: ([facility])___] : at the medical office located in  [Verbal consent obtained from patient] : the patient, [unfilled]

## 2023-07-25 LAB — PSA SERPL-MCNC: 0.15 NG/ML

## 2023-07-25 NOTE — HISTORY OF PRESENT ILLNESS
[FreeTextEntry1] : 82 year old man with [id number] adenocarcinoma of the prostate, Sonya 4+3, pre treatment PSA 6.32 ng/mL. He is s/p hypofractionated radiation to 70 Gy in 28 fractions completed on 1/14/19 with Lupron (1st 10/2018). He presents for F/U. \par \par 7/21/23 F/U. On Vesicare, alfuzosin from urologist.  Also c/o constipation which could be side effect of Vesicare.  Advised to have PSA test when he sees urologist. PSA 0.15 on 7/18/23. \par \par PSA Trend \par 08/2019 - 6.32\par 05/2019 - <0.01\par 4/22/20 - 0.07\par 10/28/20 - 0.08\par 3/05/21 - 0 .11\par 5/06/21 - 0.13\par 12/2021 -- 0.13\par 6/2022 -- 0.23\par 1/2023 -- PSA 0.14\par 7/2023 -- PSA 0.15\par

## 2023-08-10 ENCOUNTER — LABORATORY RESULT (OUTPATIENT)
Age: 83
End: 2023-08-10

## 2023-08-10 ENCOUNTER — APPOINTMENT (OUTPATIENT)
Dept: ENDOCRINOLOGY | Facility: CLINIC | Age: 83
End: 2023-08-10
Payer: MEDICARE

## 2023-08-10 VITALS
BODY MASS INDEX: 28.44 KG/M2 | HEIGHT: 72 IN | OXYGEN SATURATION: 97 % | WEIGHT: 210 LBS | SYSTOLIC BLOOD PRESSURE: 130 MMHG | HEART RATE: 65 BPM | DIASTOLIC BLOOD PRESSURE: 70 MMHG

## 2023-08-10 PROCEDURE — 10005 FNA BX W/US GDN 1ST LES: CPT

## 2023-08-10 NOTE — PROCEDURE
[Fine Needle Aspiration] : Fine needle aspiration ~T ~C was performed. [Area of Mass: ______] : mass identified in the [unfilled] [Risks] : risks [Patient] : the patient [Benefits] : benefits [Lidocaine Cream] : lidocaine cream [1%] : 1% [Supine] : The patient was placed in the supine position with the neck extended as tolerated. [Alcohol] : with alcohol [25 gauge 1.5 inch] : A 25 gauge 1.5 inch needle was used [2 Passes] : 2 passes were made through the mass [Ultrasonic Guidance] : ultrasound guidance was employed [Sent to Histology] : The specimens were prepared in the usual manner and sent to histology. [Afirma] : Afirma [Tolerated Well] : the patient tolerated the procedure well [Vital Signs Stable] : the vital signs were stable [No Complications] : There were no complications [FreeTextEntry1] : 82 year old male with history of left sided thyroid nodule ( 1.25 cm, hypoechoic, solid)   -Saved for affirma  -Will be called back with results

## 2023-08-15 ENCOUNTER — APPOINTMENT (OUTPATIENT)
Dept: ORTHOPEDIC SURGERY | Facility: CLINIC | Age: 83
End: 2023-08-15

## 2023-08-15 VITALS — HEIGHT: 72 IN | WEIGHT: 210 LBS | BODY MASS INDEX: 28.44 KG/M2

## 2023-08-15 VITALS — HEART RATE: 66 BPM | SYSTOLIC BLOOD PRESSURE: 112 MMHG | DIASTOLIC BLOOD PRESSURE: 77 MMHG

## 2023-09-05 LAB — FNA, THYROID: NORMAL

## 2023-09-06 ENCOUNTER — NON-APPOINTMENT (OUTPATIENT)
Age: 83
End: 2023-09-06

## 2023-09-12 ENCOUNTER — APPOINTMENT (OUTPATIENT)
Dept: DERMATOLOGY | Facility: CLINIC | Age: 83
End: 2023-09-12

## 2023-11-09 ENCOUNTER — APPOINTMENT (OUTPATIENT)
Dept: ENDOCRINOLOGY | Facility: CLINIC | Age: 83
End: 2023-11-09
Payer: MEDICARE

## 2023-11-09 ENCOUNTER — LABORATORY RESULT (OUTPATIENT)
Age: 83
End: 2023-11-09

## 2023-11-09 VITALS
HEIGHT: 72 IN | OXYGEN SATURATION: 98 % | SYSTOLIC BLOOD PRESSURE: 118 MMHG | BODY MASS INDEX: 28.44 KG/M2 | DIASTOLIC BLOOD PRESSURE: 78 MMHG | WEIGHT: 210 LBS | HEART RATE: 65 BPM

## 2023-11-09 PROCEDURE — 10005 FNA BX W/US GDN 1ST LES: CPT

## 2023-11-13 ENCOUNTER — APPOINTMENT (OUTPATIENT)
Dept: DERMATOLOGY | Facility: CLINIC | Age: 83
End: 2023-11-13
Payer: MEDICARE

## 2023-11-13 VITALS — HEIGHT: 72 IN | WEIGHT: 210 LBS | BODY MASS INDEX: 28.44 KG/M2

## 2023-11-13 DIAGNOSIS — L57.0 ACTINIC KERATOSIS: ICD-10-CM

## 2023-11-13 DIAGNOSIS — Z79.899 OTHER LONG TERM (CURRENT) DRUG THERAPY: ICD-10-CM

## 2023-11-13 DIAGNOSIS — B35.3 TINEA PEDIS: ICD-10-CM

## 2023-11-13 PROCEDURE — 17003 DESTRUCT PREMALG LES 2-14: CPT

## 2023-11-13 PROCEDURE — 17000 DESTRUCT PREMALG LESION: CPT

## 2023-11-13 PROCEDURE — 99214 OFFICE O/P EST MOD 30 MIN: CPT | Mod: 25

## 2023-11-13 RX ORDER — KETOCONAZOLE 20 MG/G
2 CREAM TOPICAL TWICE DAILY
Qty: 1 | Refills: 3 | Status: ACTIVE | COMMUNITY
Start: 2023-11-13 | End: 1900-01-01

## 2023-11-13 RX ORDER — MUPIROCIN 20 MG/G
2 OINTMENT TOPICAL
Qty: 1 | Refills: 2 | Status: ACTIVE | COMMUNITY
Start: 2023-11-13 | End: 1900-01-01

## 2023-12-01 ENCOUNTER — APPOINTMENT (OUTPATIENT)
Dept: ENDOCRINOLOGY | Facility: CLINIC | Age: 83
End: 2023-12-01
Payer: MEDICARE

## 2023-12-01 VITALS
HEART RATE: 59 BPM | OXYGEN SATURATION: 97 % | SYSTOLIC BLOOD PRESSURE: 118 MMHG | DIASTOLIC BLOOD PRESSURE: 74 MMHG | TEMPERATURE: 97.4 F

## 2023-12-01 PROCEDURE — 99214 OFFICE O/P EST MOD 30 MIN: CPT

## 2023-12-04 RX ORDER — SECUKINUMAB 150 MG/ML
150 INJECTION SUBCUTANEOUS
Qty: 1 | Refills: 6 | Status: ACTIVE | COMMUNITY
Start: 2022-03-23

## 2023-12-08 LAB — FNA, THYROID: NORMAL

## 2023-12-19 ENCOUNTER — APPOINTMENT (OUTPATIENT)
Dept: DERMATOLOGY | Facility: CLINIC | Age: 83
End: 2023-12-19
Payer: MEDICARE

## 2023-12-19 PROCEDURE — 99214 OFFICE O/P EST MOD 30 MIN: CPT

## 2023-12-19 NOTE — PHYSICAL EXAM
[Alert] : alert [Oriented x 3] : ~L oriented x 3 [Well Nourished] : well nourished [Declined] : declined [FreeTextEntry3] : Focused body exam performed red well demarcated plaques on 3rd, 4th toe webs b/l w/ maceration, fissuring and yellow crust   rough scaly papules on L forehead and b/l temples

## 2023-12-19 NOTE — HISTORY OF PRESENT ILLNESS
[FreeTextEntry1] : RPV: rash [de-identified] : f/u pso and tinea  was on cosentyx, last dose Nov 2023, no longer eligible for free drug

## 2023-12-19 NOTE — ASSESSMENT
[FreeTextEntry1] : #Pso dorsal feet clob crm no longer eligible for cosentyx, will consider other options Insufficient response to MTX (Jan 2020-Dec 2020, Nov 2021-March 2022) Prior tx includes excimer, phototherapy, otezla, clobetasol  Tinea Pedis, b/w toes keto crm   #Hx Actinic keratosis

## 2023-12-21 RX ORDER — TILDRAKIZUMAB-ASMN 100 MG/ML
100 INJECTION, SOLUTION SUBCUTANEOUS
Qty: 2 | Refills: 0 | Status: ACTIVE | OUTPATIENT
Start: 2023-12-21

## 2023-12-21 RX ORDER — TILDRAKIZUMAB-ASMN 100 MG/ML
100 INJECTION, SOLUTION SUBCUTANEOUS
Qty: 1 | Refills: 3 | Status: ACTIVE | OUTPATIENT
Start: 2023-12-21

## 2024-01-23 ENCOUNTER — APPOINTMENT (OUTPATIENT)
Dept: SURGERY | Facility: CLINIC | Age: 84
End: 2024-01-23
Payer: MEDICARE

## 2024-01-23 VITALS
DIASTOLIC BLOOD PRESSURE: 78 MMHG | BODY MASS INDEX: 28.44 KG/M2 | SYSTOLIC BLOOD PRESSURE: 131 MMHG | HEIGHT: 72 IN | WEIGHT: 210 LBS | HEART RATE: 53 BPM

## 2024-01-23 LAB — CALCIT SERPL-MCNC: 344 PG/ML

## 2024-01-23 PROCEDURE — 99204 OFFICE O/P NEW MOD 45 MIN: CPT | Mod: 25

## 2024-01-23 PROCEDURE — 31575 DIAGNOSTIC LARYNGOSCOPY: CPT

## 2024-01-23 PROCEDURE — 36415 COLL VENOUS BLD VENIPUNCTURE: CPT

## 2024-01-23 NOTE — CONSULT LETTER
[Dear  ___] : Dear  [unfilled], [Consult Letter:] : I had the pleasure of evaluating your patient, [unfilled]. [Please see my note below.] : Please see my note below. [Consult Closing:] : Thank you very much for allowing me to participate in the care of this patient.  If you have any questions, please do not hesitate to contact me. [Sincerely,] : Sincerely, [FreeTextEntry2] : Dr. Yamini Wolfe, Dr. Fabiola Young [FreeTextEntry3] : Santiago Subramanian MD, FACS System Director, Endocrine Surgery [facility] Associate  Professor of Surgery [employer] at [facility] [DrHernesto  ___] : Dr. DHALIWAL

## 2024-01-23 NOTE — PHYSICAL EXAM
[de-identified] : 1.2 cm firm left thyroid nodule, well circumscribed and mobile [Nasal Endoscopy Performed] : nasal endoscopy was performed, see procedure section for findings [Laryngoscopy Performed] : laryngoscopy was performed, see procedure section for findings [Midline] : located in midline position [Normal] : orientation to person, place, and time: normal [de-identified] : fiberoptic laryngoscopy shows normal vocal cord mobility bilaterally with no lesions noted

## 2024-01-23 NOTE — ASSESSMENT
[FreeTextEntry1] : lengthy discussion regarding options for management.  recommended total thyroidectomy with paratracheal node dissection.  risks, benefits and alternatives discussed at length.  I have discussed with the patient the anatomy of the area, the pathophysiology of the disease process and the rationale for surgery.  The attendant risks, possible complications and expected postoperative course have been discussed in detail.  I have given the patient the opportunity to ask questions, and all questions have been answered to the patient's satisfaction, and they wish to proceed with the planned procedure.  to be scheduled inpatient at [facility].  bloods drawn. CT requested. to call next week for results.

## 2024-01-23 NOTE — HISTORY OF PRESENT ILLNESS
[de-identified] : Pt c/o Thyroid nodule. denies dysphagia, hoarseness, SOB or RT exposure sonogram: Left 1.5 cm, 1.1 cm and Right 9 mm and 1.0 cm thyroid nodules FNA: Left AUS Thyroseq positive RET and increased expression of calcitonin normal TFTs, calcium 9.5 I have reviewed all old and new data and available images.

## 2024-01-24 LAB
25(OH)D3 SERPL-MCNC: 23.6 NG/ML
CALCIUM SERPL-MCNC: 9.3 MG/DL
CALCIUM SERPL-MCNC: 9.3 MG/DL
CEA SERPL-MCNC: 12.3 NG/ML
PARATHYROID HORMONE INTACT: 34 PG/ML
T3 SERPL-MCNC: 96 NG/DL
T4 FREE SERPL-MCNC: 1.4 NG/DL
TSH SERPL-ACNC: 2.69 UIU/ML

## 2024-01-25 LAB
THYROGLOB AB SERPL-ACNC: <20 IU/ML
THYROPEROXIDASE AB SERPL IA-ACNC: 14.5 IU/ML

## 2024-01-30 ENCOUNTER — APPOINTMENT (OUTPATIENT)
Dept: DERMATOLOGY | Facility: CLINIC | Age: 84
End: 2024-01-30
Payer: MEDICARE

## 2024-01-30 PROCEDURE — 99213 OFFICE O/P EST LOW 20 MIN: CPT

## 2024-01-30 RX ORDER — CLOBETASOL PROPIONATE 0.5 MG/G
0.05 OINTMENT TOPICAL
Qty: 1 | Refills: 2 | Status: ACTIVE | COMMUNITY
Start: 2019-03-05 | End: 1900-01-01

## 2024-01-31 ENCOUNTER — APPOINTMENT (OUTPATIENT)
Dept: CT IMAGING | Facility: IMAGING CENTER | Age: 84
End: 2024-01-31
Payer: MEDICARE

## 2024-01-31 ENCOUNTER — OUTPATIENT (OUTPATIENT)
Dept: OUTPATIENT SERVICES | Facility: HOSPITAL | Age: 84
LOS: 1 days | End: 2024-01-31
Payer: MEDICARE

## 2024-01-31 ENCOUNTER — APPOINTMENT (OUTPATIENT)
Dept: ULTRASOUND IMAGING | Facility: IMAGING CENTER | Age: 84
End: 2024-01-31
Payer: MEDICARE

## 2024-01-31 ENCOUNTER — NON-APPOINTMENT (OUTPATIENT)
Age: 84
End: 2024-01-31

## 2024-01-31 DIAGNOSIS — Z95.1 PRESENCE OF AORTOCORONARY BYPASS GRAFT: Chronic | ICD-10-CM

## 2024-01-31 DIAGNOSIS — C73 MALIGNANT NEOPLASM OF THYROID GLAND: ICD-10-CM

## 2024-01-31 PROCEDURE — 76536 US EXAM OF HEAD AND NECK: CPT

## 2024-01-31 PROCEDURE — 76536 US EXAM OF HEAD AND NECK: CPT | Mod: 26

## 2024-01-31 PROCEDURE — 70491 CT SOFT TISSUE NECK W/DYE: CPT

## 2024-01-31 PROCEDURE — 70491 CT SOFT TISSUE NECK W/DYE: CPT | Mod: 26,MH

## 2024-02-02 ENCOUNTER — APPOINTMENT (OUTPATIENT)
Dept: ENDOCRINOLOGY | Facility: CLINIC | Age: 84
End: 2024-02-02
Payer: MEDICARE

## 2024-02-02 VITALS
TEMPERATURE: 97.7 F | WEIGHT: 210 LBS | HEIGHT: 72 IN | BODY MASS INDEX: 28.44 KG/M2 | OXYGEN SATURATION: 98 % | DIASTOLIC BLOOD PRESSURE: 80 MMHG | HEART RATE: 58 BPM | SYSTOLIC BLOOD PRESSURE: 130 MMHG

## 2024-02-02 DIAGNOSIS — C73 MALIGNANT NEOPLASM OF THYROID GLAND: ICD-10-CM

## 2024-02-02 PROCEDURE — G2211 COMPLEX E/M VISIT ADD ON: CPT

## 2024-02-02 PROCEDURE — 99214 OFFICE O/P EST MOD 30 MIN: CPT

## 2024-02-02 NOTE — HISTORY OF PRESENT ILLNESS
[FreeTextEntry1] : Since last visit, patient saw ENT and is now scheduled for total thyroidectomy.  Still pending some labs #MNG Diagnosis: May 2023  Last US date: 5/2023 Right lobe: 4.7 x 1.4 x 1.6cm  Nodule #1 0.9 x 0.9 x 0.9 TR 4 nodule Nodule #2 1.0 x 0.6 x 0.8cm TR3 nodule Left lobe: 4.3 x 1.6 x 1.7cm Nodule #1 1.4 x 1.5 x 1.2 cm TR 4 nodule Nodule #2 1.1 x 0.8 x 0.9cm TR4 nodule Isthmus: 0.9cm Cervical LNs: no abnormal lymph nodes FNA in the past: Yes.  Patient initially had FNA which showed AUS with Afirma which was suspicious.  He underwent a repeat FNA which showed AUS but with thyro seq resulted with medullary thyroid cancer  FH: Denies History of radiation: Yes to the prostate. No other radiation to the neck Compressive symptoms: Denies any difficulty breathing, drinking or swallowing food

## 2024-02-02 NOTE — PHYSICAL EXAM
[TextEntry] : General: Patient appears well nourished without any distress  HEENT: Thyroid is supple, no nodules palpated, no LAD  Cardio: RRR, no murmurs appreciated  Pulm: CTA b/l, no wheezes, no edema  Skin: No significant rashes or bruises noted Neuro: No focal deficits noted. No tremors GI: No pain with palpation

## 2024-02-02 NOTE — ASSESSMENT
[FreeTextEntry1] : #Medullary thyroid cancer -Patient with recent diagnosis of medullary thyroid cancer after he underwent FNA twice. -Explained to patient the diagnosis and its relation with the RET mutation -PTH noted to be stable along with his calcium levels -CEA is elevated at 12.3 and calcitonin is elevated at 344 Plan: -He just had US and CT done yesterday- will need to follow up with results  -Patient now scheduled for total thyroidectomy pending his metanephrines -Patient should also see a  regarding RET gene testing

## 2024-02-05 LAB
METANEPHRINE, PL: <25 PG/ML
NORMETANEPHRINE, PL: 84.6 PG/ML

## 2024-02-08 ENCOUNTER — APPOINTMENT (OUTPATIENT)
Dept: DERMATOLOGY | Facility: CLINIC | Age: 84
End: 2024-02-08
Payer: MEDICARE

## 2024-02-08 PROCEDURE — 99214 OFFICE O/P EST MOD 30 MIN: CPT | Mod: 25

## 2024-02-08 PROCEDURE — 96401 CHEMO ANTI-NEOPL SQ/IM: CPT

## 2024-02-08 NOTE — PHYSICAL EXAM
[Alert] : alert [Oriented x 3] : ~L oriented x 3 [Well Nourished] : well nourished [Declined] : declined [FreeTextEntry3] : Focused body exam performed few pink well demarcated scaly papules on b/l shins, scalp, L shoulder, some excoriated, <1% BSA

## 2024-02-08 NOTE — HISTORY OF PRESENT ILLNESS
[FreeTextEntry1] : F/u pso [de-identified] : 83yoM last seen 1/30/2024 by Dr. Mantilla, here for f/u evaluation of the above. Here to start Ilumya.  Previously on Cosentyx, last dose Nov 2023, no longer eligible for free drug.  notes few spots on shins, scalp, L shoulder--picks at them frequently. not particularly itchy. had areas of involvement on feet at [facility] improved with clobetasol

## 2024-02-08 NOTE — ASSESSMENT
[FreeTextEntry1] : #Pso, shins > scalp, L shoulder c/w clob as an ointment (per pt preference) PRN, SED Insufficient response to MTX (Jan 2020-Dec 2020, Nov 2021-March 2022) - Prior tx includes excimer, phototherapy, otezla, clobetasol - no longer eligible for cosentyx.  - Discussed IL-23 inhibitors in detail. We have discussed the possible adverse effects of this medication, including but not limited to common and uncommon side effects of biologic medications including injection site reactions, flu-like symptoms, infections, including URI, diarrhea, urticaria, angioedema, tuberculosis, other serious and opportunistic infections, and hypersensitivity reaction. - We discussed that he should inform his PMD and ensure that he is up to date on all immunizations. - Live vaccines should not be given concurrently; there are no data available concerning secondary transmission of infection by live vaccines in patients receiving therapy.  Tildrakizumab 100 mg SC administered to the right anterior thigh today without complication. Lot # [id number], Exp 7/2026  - Pending normal labs below, RTC 4 weeks, and then every 12 weeks thereafter.  #High risk medication use - Reviewed Mar 2022 labs- CBC, CMP, hepatitis panel, and quant all WNL.  - Repeat CBC, CMP, hepatitis, and quant gold today.  - Labs: CBC and LFTs every 3 to 6 months.  RTC 4 weeks as above.

## 2024-02-08 NOTE — END OF VISIT
[] : Resident [TextEntry] : Physician Assistant Statement: Case was discussed and supervised by attending physician.

## 2024-03-07 ENCOUNTER — APPOINTMENT (OUTPATIENT)
Dept: DERMATOLOGY | Facility: CLINIC | Age: 84
End: 2024-03-07
Payer: MEDICARE

## 2024-03-07 DIAGNOSIS — L85.3 XEROSIS CUTIS: ICD-10-CM

## 2024-03-07 DIAGNOSIS — L40.0 PSORIASIS VULGARIS: ICD-10-CM

## 2024-03-07 PROCEDURE — 96401 CHEMO ANTI-NEOPL SQ/IM: CPT

## 2024-03-07 PROCEDURE — 99214 OFFICE O/P EST MOD 30 MIN: CPT | Mod: 25

## 2024-03-07 NOTE — ASSESSMENT
[FreeTextEntry1] : #Psoriasis vulgaris, chronic, improving - Insufficient response to MTX (Jan 2020-Dec 2020, Nov 2021-March 2022) - Prior tx includes excimer, phototherapy, otezla, clobetasol - no longer eligible for cosentyx.  - Started tildrakizumab on 2/08/2024.  - Discussed IL-23 inhibitors in detail. We have discussed the possible adverse effects of this medication, including but not limited to common and uncommon side effects of biologic medications including injection site reactions, flu-like symptoms, infections, including URI, diarrhea, urticaria, angioedema, tuberculosis, other serious and opportunistic infections, and hypersensitivity reaction. - We discussed that he should inform his PMD and ensure that he is up to date on all immunizations. - Live vaccines should not be given concurrently; there are no data available concerning secondary transmission of infection by live vaccines in patients receiving therapy.  Tildrakizumab 100 mg SC administered to the right upper arm without complication. Lot # [id number], Exp 7/2026  - Pending normal labs below, RTC q 12 weeks thereafter. - c/w clob as an ointment (per pt preference) PRN, SED - Discussed TAC for back as needed- Pt declines today.   #High risk medication use - Reviewed Mar 2022 labs- CBC, CMP, hepatitis panel, and quant all WNL.  - Repeat CBC, CMP, hepatitis, and quant gold today.  - Labs: repeat CBC and LFTs every 3 to 6 months.  #Xerosis cutis, generalized, chronic, not at treatment goal - Education and counseling - Gentle skin care reviewed.  - Emphasized to use gentle, fragrance-free personal care products (including soap and laundry detergent). Avoid scrubbing/rubbing skin, no loofas or washcloths. Limit showers to once daily with lukewarm water.  - Velarde use of bland emollients. Apply liberally within 3 min of getting out of the bath/shower.   RTC 12 weeks as above.

## 2024-03-07 NOTE — END OF VISIT
Pt arrives with c/o bilateral flank pain for about 1 month. Pt tachycardic in triage. Pt reports intermittent stream while urinating, pt took azo today.      Triage Assessment     Row Name 10/22/22 1249       Triage Assessment (Adult)    Airway WDL WDL       Respiratory WDL    Respiratory WDL WDL       Skin Circulation/Temperature WDL    Skin Circulation/Temperature WDL WDL       Cardiac WDL    Cardiac WDL X;rhythm    Pulse Rate & Regularity tachycardic       Peripheral/Neurovascular WDL    Peripheral Neurovascular WDL WDL       Cognitive/Neuro/Behavioral WDL    Cognitive/Neuro/Behavioral WDL WDL               [TextEntry] : Physician Assistant Statement: Case was discussed and supervised by attending physician.

## 2024-03-07 NOTE — PHYSICAL EXAM
[Alert] : alert [Oriented x 3] : ~L oriented x 3 [Well Nourished] : well nourished [Declined] : declined [FreeTextEntry3] : Focused skin exam performed  The relevant portions of the exam were performed today  AAOx3, NAD, well-appearing / pleasant Focused examination within normal limits with the exception of: Diffuse xerosis Few pink scaly papules on the lower back with surrounding excoriations

## 2024-03-07 NOTE — HISTORY OF PRESENT ILLNESS
[FreeTextEntry1] : F/u pso [de-identified] : 83yoM last seen 4 weeks ago, here for f/u evaluation of the above. Here for 4 week Ilumya injection (loading).  Notices initially that psoriasis flared after first injection but has started clearing recently.  He did not get monitoring labs done at . Previously on Cosentyx, last dose Nov 2023, no longer eligible for free drug.

## 2024-03-08 LAB
ALBUMIN SERPL ELPH-MCNC: 4.4 G/DL
ALP BLD-CCNC: 58 U/L
ALT SERPL-CCNC: 22 U/L
ANION GAP SERPL CALC-SCNC: 13 MMOL/L
AST SERPL-CCNC: 23 U/L
BASOPHILS # BLD AUTO: 0.03 K/UL
BASOPHILS NFR BLD AUTO: 0.4 %
BILIRUB SERPL-MCNC: 1 MG/DL
BUN SERPL-MCNC: 22 MG/DL
CALCIUM SERPL-MCNC: 9.5 MG/DL
CHLORIDE SERPL-SCNC: 103 MMOL/L
CO2 SERPL-SCNC: 23 MMOL/L
CREAT SERPL-MCNC: 1.01 MG/DL
EGFR: 75 ML/MIN/1.73M2
EOSINOPHIL # BLD AUTO: 0.25 K/UL
EOSINOPHIL NFR BLD AUTO: 3.7 %
GLUCOSE SERPL-MCNC: 167 MG/DL
HBV CORE IGG+IGM SER QL: NONREACTIVE
HBV SURFACE AB SER QL: NONREACTIVE
HBV SURFACE AG SER QL: NONREACTIVE
HCT VFR BLD CALC: 47.8 %
HCV AB SER QL: NONREACTIVE
HCV S/CO RATIO: 0.11 S/CO
HGB BLD-MCNC: 16.7 G/DL
IMM GRANULOCYTES NFR BLD AUTO: 0.6 %
LYMPHOCYTES # BLD AUTO: 1.13 K/UL
LYMPHOCYTES NFR BLD AUTO: 16.8 %
MAN DIFF?: NORMAL
MCHC RBC-ENTMCNC: 32.9 PG
MCHC RBC-ENTMCNC: 34.9 GM/DL
MCV RBC AUTO: 94.1 FL
MONOCYTES # BLD AUTO: 0.52 K/UL
MONOCYTES NFR BLD AUTO: 7.7 %
NEUTROPHILS # BLD AUTO: 4.75 K/UL
NEUTROPHILS NFR BLD AUTO: 70.8 %
PLATELET # BLD AUTO: 189 K/UL
POTASSIUM SERPL-SCNC: 4.4 MMOL/L
PROT SERPL-MCNC: 6.6 G/DL
RBC # BLD: 5.08 M/UL
RBC # FLD: 13.2 %
SODIUM SERPL-SCNC: 139 MMOL/L
WBC # FLD AUTO: 6.72 K/UL

## 2024-03-11 NOTE — HISTORY OF PRESENT ILLNESS
This is a 70-year-old male with a history of type 2 diabetes, hyperlipidemia, CHF, cardiomyopathy status post implantable defibrillator, left bundle branch block, cardiomyopathy referred from Dr. Norwood for evaluation of chronic constipation. He was last seen 12/10/2013 for a screening colonoscopy notable for good prep, sigmoid and descending diverticulosis, and moderate nonbleeding internal hemorrhoids. He was having daily bowel movements without intervention until 2 months ago.  He has experienced significant constipation reporting a bowel movement once a week.  In order to have bowel movements, he has been taking multiple medications.  Yesterday, he took magnesium citrate, milk of magnesia, and 2 enemas in order to have bowel movements.  He reports a significant amount of stool after these medications including loose stool.  He tried MiraLAX daily for months without improvement.  There was no change in bowel movement frequency or consistency.  He denies abdominal pain, red blood per rectum, or any other abdominal symptoms.  He takes oxycodone 4 times a day to manage chronic pain.  Frequency and dosage has not been increased.
[FreeTextEntry1] : 78 y/o male with h/o DM2, newly diagnosed prostate cancer presents here for consideration of radiation treatment. IPSS score 10, EPIC 8. On Alfuzosin for urinary retention, frequency. \par \par Prostate biopsy in 9/2018 revealed [place] 4+3 in one core with 20% of tissue involved, [place] 3+4 in one core with 10% involved. Total 19 cores. MRI in 8/2018 showed prostate vol 99cc., score 5. PSA 6.32 in 8/2018.\par \par 4/28 fractions completed. Burning urination is getting better. c/o frequency. Advised pt to continue Alfuzosin given from urologist

## 2024-03-12 ENCOUNTER — NON-APPOINTMENT (OUTPATIENT)
Age: 84
End: 2024-03-12

## 2024-03-12 LAB
M TB IFN-G BLD-IMP: NEGATIVE
QUANTIFERON TB PLUS MITOGEN MINUS NIL: 5.86 IU/ML
QUANTIFERON TB PLUS NIL: 0.02 IU/ML
QUANTIFERON TB PLUS TB1 MINUS NIL: 0 IU/ML
QUANTIFERON TB PLUS TB2 MINUS NIL: 0 IU/ML

## 2024-04-16 NOTE — HISTORY OF PRESENT ILLNESS
[FreeTextEntry1] : F/u pso [de-identified] : 83yoM here for f/u evaluation of the above.   Here for 4 week Ilumya injection (loading).  Notices initially that psoriasis flared after first injection but has started clearing recently.  He did not get monitoring labs done at . Previously on Cosentyx, last dose Nov 2023, no longer eligible for free drug.

## 2024-04-16 NOTE — ASSESSMENT
Upon reviewing your office visit note it appears that the documentation on file is not meeting the criteria outlined by this patient's insurance company policy for a sleep study. If you feel this testing is warranted, You may add .sleepnote to the chart notes or more information regarding the sleep problems. Insurance companies are looking for the following: snoring, witnessed apneas, waking with choking and/or gasping, daytime sleepiness, comorbid conditions like heart or lung disease.  They are looking for at least 2-3 to send for pre authorization. The notes only list sleepiness.   You may refer the patient to a sleep physician. Please let us know . thank you     [FreeTextEntry1] : #Psoriasis vulgaris, chronic, improving - Insufficient response to MTX (Jan 2020-Dec 2020, Nov 2021-March 2022) - Prior tx includes excimer, phototherapy, otezla, clobetasol - no longer eligible for cosentyx.  - Started tildrakizumab on 2/08/2024. _________________ - Discussed IL-23 inhibitors in detail. We have discussed the possible adverse effects of this medication, including but not limited to common and uncommon side effects of biologic medications including injection site reactions, flu-like symptoms, infections, including URI, diarrhea, urticaria, angioedema, tuberculosis, other serious and opportunistic infections, and hypersensitivity reaction. - c/w clob as an ointment (per pt preference) PRN, SED  #High risk medication use - Reviewed Mar 2024 labs  ____________

## 2024-05-08 ENCOUNTER — APPOINTMENT (OUTPATIENT)
Dept: ENDOCRINOLOGY | Facility: CLINIC | Age: 84
End: 2024-05-08

## 2024-05-24 ENCOUNTER — APPOINTMENT (OUTPATIENT)
Dept: RADIATION ONCOLOGY | Facility: CLINIC | Age: 84
End: 2024-05-24
Payer: MEDICARE

## 2024-05-24 VITALS
SYSTOLIC BLOOD PRESSURE: 132 MMHG | HEIGHT: 74 IN | OXYGEN SATURATION: 97 % | DIASTOLIC BLOOD PRESSURE: 81 MMHG | WEIGHT: 212.19 LBS | HEART RATE: 66 BPM | BODY MASS INDEX: 27.23 KG/M2 | RESPIRATION RATE: 17 BRPM

## 2024-05-24 PROCEDURE — 99214 OFFICE O/P EST MOD 30 MIN: CPT

## 2024-05-24 RX ORDER — ALFUZOSIN HYDROCHLORIDE 10 MG/1
10 TABLET, EXTENDED RELEASE ORAL
Qty: 180 | Refills: 3 | Status: DISCONTINUED | COMMUNITY
Start: 2017-08-02 | End: 2024-05-24

## 2024-05-24 NOTE — VITALS
[Maximal Pain Intensity: 5/10] : 5/10 [Least Pain Intensity: 0/10] : 0/10 [Pain Description/Quality: ___] : Pain description/quality: [unfilled] [Pain Duration: ___] : Pain duration: [unfilled] [Pain Location: ___] : Pain Location: [unfilled] [90: Able to carry normal activity; minor signs or symptoms of disease.] : 90: Able to carry normal activity; minor signs or symptoms of disease.  [ECOG Performance Status: 0 - Fully active, able to carry on all pre-disease performance without restriction] : Performance Status: 0 - Fully active, able to carry on all pre-disease performance without restriction

## 2024-05-27 NOTE — REVIEW OF SYSTEMS
[Constipation: Grade 0] : Constipation: Grade 0 [Diarrhea: Grade 0] : Diarrhea: Grade 0 [Dysphagia: Grade 0] : Dysphagia: Grade 0 [Nausea: Grade 0] : Nausea: Grade 0 [Vomiting: Grade 0] : Vomiting: Grade 0 [Neck Edema: Grade 0] : Neck Edema: Grade 0 [Hematuria: Grade 0] : Hematuria: Grade 0 [Urinary Incontinence: Grade 0] : Urinary Incontinence: Grade 0  [Urinary Retention: Grade 0] : Urinary Retention: Grade 0 [Urinary Tract Pain: Grade 0] : Urinary Tract Pain: Grade 0 [Urinary Frequency: Grade 1 - Present] : Urinary Frequency: Grade 1 - Present [Cough: Grade 1 - Mild symptoms; nonprescription intervention indicated] : Cough: Grade 1 - Mild symptoms; nonprescription intervention indicated [Hoarseness: Grade 1 - Mild or intermittent voice change; fully understandable; self-resolves] : Hoarseness: Grade 1 - Mild or intermittent voice change; fully understandable; self-resolves [FreeTextEntry1] : states PND [FreeTextEntry4] : baseline

## 2024-05-27 NOTE — HISTORY OF PRESENT ILLNESS
[FreeTextEntry1] : 83 year old man with [id number] adenocarcinoma of the prostate, Sonya 4+3, pre treatment PSA 6.32 ng/mL. He is s/p hypofractionated radiation to 70 Gy in 28 fractions completed on 1/14/19 with Lupron (1st 10/2018). He presents today in consultation for newly diagnosed medullary thyroid carcinoma.   5/24/2024: Patient presents to clinic for reevaluation since diagnosed with biopsy proven Medullary thyroid carcinoma.  Since last seen he has had a consultation with Dr. Subramanian on 1/23/2024 for a thyroid nodule,  FNA: Left AUS Thyroseq positive RET and increased expression of calcitonin US Thyroid performed on 2/15/2024: FINDINGS: Right Lobe: 4.9 cm x 1.9 cm x 2.0 cm. Normal in size. Mildly heterogeneous in echotexture. There is 0.9 x 0.8 x 0.6 cm heterogeneous partially calcified nodule in the mid to upper pole (TIRAD-4). There is 0.9 x .7 cm spongiform benign-appearing nodule in the lower pole. (TIRAD-1). Left Lobe: 4.0 cm x 2.1 cm x 2.0 cm. Normal in size. Mildly heterogeneous echotexture. There is 1.4 x 1.3 x 1.3 cm mildly hypoechoic nodule in the midpole (TIRAD-4). There is 1.1 x 0.9 x 0.7 cm spongiform benign-appearing nodule in the lower pole. (TIRAD-1). There is 0.5 x 0.5 x 0.5 cm rim calcified nodule in the lower pole (TIRAD-4). Isthmus: 3 mm. Cervical Lymph Nodes: No suspicious adenopathy. There is 1.3 x 0.3 cm benign-appearing right cervical level 3 lymph node.  He is tentatively scheduled with Dr. Subramanian for total thyroidectomy with paratracheal node dissection on 6/3/2024  Patient states feeling well overall. Denies dysphagia, odynophagia. He states his vocal hoarseness is without change from baseline for many years, He endorses neck pain with turning, right >left  for over a few months at a 5/10 pain score he is unable to charachterize the pain at this time other than saying it is painful.  He states he has PND with a cough that is worse in the mornings. Denies nausea/vomiting, recent fevers/chills/night sweats. Weight has been stable. He continues to be highly active. Blood PSA test ordered today. F/U in few wks.

## 2024-05-28 ENCOUNTER — OUTPATIENT (OUTPATIENT)
Dept: OUTPATIENT SERVICES | Facility: HOSPITAL | Age: 84
LOS: 1 days | End: 2024-05-28

## 2024-05-28 VITALS
RESPIRATION RATE: 16 BRPM | TEMPERATURE: 98 F | WEIGHT: 210.98 LBS | DIASTOLIC BLOOD PRESSURE: 70 MMHG | HEART RATE: 64 BPM | OXYGEN SATURATION: 98 % | HEIGHT: 72 IN | SYSTOLIC BLOOD PRESSURE: 122 MMHG

## 2024-05-28 DIAGNOSIS — Z85.46 PERSONAL HISTORY OF MALIGNANT NEOPLASM OF PROSTATE: Chronic | ICD-10-CM

## 2024-05-28 DIAGNOSIS — C73 MALIGNANT NEOPLASM OF THYROID GLAND: ICD-10-CM

## 2024-05-28 DIAGNOSIS — E11.9 TYPE 2 DIABETES MELLITUS WITHOUT COMPLICATIONS: ICD-10-CM

## 2024-05-28 DIAGNOSIS — Z95.1 PRESENCE OF AORTOCORONARY BYPASS GRAFT: Chronic | ICD-10-CM

## 2024-05-28 DIAGNOSIS — I10 ESSENTIAL (PRIMARY) HYPERTENSION: ICD-10-CM

## 2024-05-28 DIAGNOSIS — Z98.890 OTHER SPECIFIED POSTPROCEDURAL STATES: Chronic | ICD-10-CM

## 2024-05-28 RX ORDER — METFORMIN HYDROCHLORIDE 850 MG/1
1 TABLET ORAL
Qty: 0 | Refills: 0 | DISCHARGE

## 2024-05-28 RX ORDER — FOLIC ACID 0.8 MG
1 TABLET ORAL
Qty: 0 | Refills: 0 | DISCHARGE

## 2024-05-28 RX ORDER — METHOTREXATE 2.5 MG/1
6 TABLET ORAL
Qty: 0 | Refills: 0 | DISCHARGE

## 2024-05-28 RX ORDER — ATORVASTATIN CALCIUM 80 MG/1
1 TABLET, FILM COATED ORAL
Qty: 0 | Refills: 0 | DISCHARGE

## 2024-05-28 RX ORDER — METOPROLOL TARTRATE 50 MG
1 TABLET ORAL
Qty: 0 | Refills: 0 | DISCHARGE

## 2024-05-28 RX ORDER — SODIUM CHLORIDE 9 MG/ML
1000 INJECTION, SOLUTION INTRAVENOUS
Refills: 0 | Status: DISCONTINUED | OUTPATIENT
Start: 2024-06-03 | End: 2024-06-03

## 2024-05-28 RX ORDER — SECUKINUMAB 150 MG/ML
0 INJECTION SUBCUTANEOUS
Qty: 0 | Refills: 0 | DISCHARGE

## 2024-05-28 RX ORDER — CHOLECALCIFEROL (VITAMIN D3) 125 MCG
1 CAPSULE ORAL
Qty: 0 | Refills: 0 | DISCHARGE

## 2024-05-28 RX ORDER — SOLIFENACIN SUCCINATE 10 MG/1
1 TABLET ORAL
Qty: 0 | Refills: 0 | DISCHARGE

## 2024-05-28 RX ORDER — PREGABALIN 225 MG/1
2500 CAPSULE ORAL
Qty: 0 | Refills: 0 | DISCHARGE

## 2024-05-28 RX ORDER — ASPIRIN/CALCIUM CARB/MAGNESIUM 324 MG
1 TABLET ORAL
Qty: 0 | Refills: 0 | DISCHARGE

## 2024-05-28 NOTE — H&P PST ADULT - NSICDXPASTSURGICALHX_GEN_ALL_CORE_FT
PAST SURGICAL HISTORY:  H/O prostate cancer     S/P excision of lipoma     S/P triple vessel bypass 2019

## 2024-05-28 NOTE — H&P PST ADULT - PROBLEM SELECTOR PLAN 1
Patient tentatively scheduled for   on  Pre-op instructions provided. Pt given verbal and written instructions with teach back on chlorhexidine wash  and pepcid. Pt verbalized understanding with return demonstration.  cbc, cmp done 03/2024 - copy of results in chart. Patient tentatively scheduled for Total thyroidectomy,  paratracheal node dissection , recurrent nerve monitoring on 06/03/2024.   Pre-op instructions provided. Pt given verbal and written instructions with teach back on chlorhexidine wash  and pepcid. Pt verbalized understanding with return demonstration.  cbc, cmp done 03/2024 - copy of results in chart.

## 2024-05-28 NOTE — H&P PST ADULT - ADDITIONAL PE
Mallampati -3  Denies dentures. Denies loose teeth. Mallampati -2  Denies dentures. Denies loose teeth.

## 2024-05-28 NOTE — H&P PST ADULT - LAST CARDIAC ANGIOGRAM/IMAGING
"they may have done that when I went for the triple bypass 2019 ", "[facility] 2019 - had  triple bypass 2019 at [facility]

## 2024-05-28 NOTE — H&P PST ADULT - PROBLEM SELECTOR PLAN 3
Patient instructed not to take metformin  on the morning of procedure.   Check fingerstick DOS Patient instructed not to take metformin on the morning of procedure.   Check fingerstick DOS

## 2024-05-28 NOTE — H&P PST ADULT - PROBLEM SELECTOR PLAN 2
Patient instructed to take metoprolol with a sip of water on the morning of procedure. Patient instructed to take metoprolol with a sip of water on the morning of procedure.  Requesting cardiologist for echo and stress reports. Patient instructed to take metoprolol with a sip of water on the morning of procedure.  Requesting cardiologist for recent EKG, echo , cath and stress reports.

## 2024-05-28 NOTE — H&P PST ADULT - HISTORY OF PRESENT ILLNESS
Take your medication as prescribed. Use Benadryl 25 - 50 milligrams (1 - 2 tabs) every 6 hours for the next 24 - 48 hours (do not use if you were given a prescription for hydroxyzine). Stop using any new medications, detergents, soaps, shampoos, hair dye or anything else that could have caused this allergic reaction. PLEASE RETURN TO THE EMERGENCY DEPARTMENT IMMEDIATELY for worsening symptoms of the reaction, shortness of breath, wheezing, sensation of your throat is closing, or if you develop any concerning symptoms such as: high fever not relieved by acetaminophen (Tylenol) and/or ibuprofen (Motrin), chills, shortness of breath, chest pain, persistent nausea and/or vomiting, numbness, weakness or tingling in the arms or legs or change in color of the extremities, changes in mental status, persistent headache, blurry vision, unable to follow up with your physician, or other any other care or concern. 83 year old male  83 year old male with hx of HTN, HLD, Type 2 DM , TVD bypass 2019 , Prostate Cancer s/p radiation therapy - last 01/2019  c/o thyroid nodule since last few months . FNA biopsy showed left AUS Thyroseq positive RET and increased expression of calcitonin 344.0 pg/ml ( 01/24).   Pt presents to PST today with pre op dx of medullary thyroid carcinoma is scheduled for total thyroidectomy,  paratracheal node dissection , recurrent nerve monitoring.

## 2024-05-28 NOTE — H&P PST ADULT - PROBLEM/PLAN-1
DISPLAY PLAN FREE TEXT Qbrexza Counseling:  I discussed with the patient the risks of Qbrexza including but not limited to headache, mydriasis, blurred vision, dry eyes, nasal dryness, dry mouth, dry throat, dry skin, urinary hesitation, and constipation.  Local skin reactions including erythema, burning, stinging, and itching can also occur.

## 2024-05-28 NOTE — H&P PST ADULT - NSICDXPASTMEDICALHX_GEN_ALL_CORE_FT
PAST MEDICAL HISTORY:  Dupuytren's disease of finger right    HTN (hypertension)     Hyperlipidemia     Obese     Type II diabetes mellitus      PAST MEDICAL HISTORY:  Dupuytren's disease of finger right    HTN (hypertension)     Hyperlipidemia     Medullary thyroid carcinoma     Obese     Type II diabetes mellitus

## 2024-05-28 NOTE — H&P PST ADULT - ENMT COMMENTS
c/o thyroid nodule, denies dysphagia, hoarseness, SOB or RT exposure Pre op dx- Medullary thyroid carcinoma

## 2024-05-31 NOTE — ASU PATIENT PROFILE, ADULT - FALL HARM RISK - UNIVERSAL INTERVENTIONS
Bed in lowest position, wheels locked, appropriate side rails in place/Call bell, personal items and telephone in reach/Instruct patient to call for assistance before getting out of bed or chair/Non-slip footwear when patient is out of bed/[place] to call system/Physically safe environment - no spills, clutter or unnecessary equipment/Purposeful Proactive Rounding/Room/bathroom lighting operational, light cord in reach

## 2024-06-02 ENCOUNTER — TRANSCRIPTION ENCOUNTER (OUTPATIENT)
Age: 84
End: 2024-06-02

## 2024-06-03 ENCOUNTER — INPATIENT (INPATIENT)
Facility: HOSPITAL | Age: 84
LOS: 0 days | Discharge: HOME | End: 2024-06-04
Attending: SPECIALIST | Admitting: SPECIALIST
Payer: MEDICARE

## 2024-06-03 ENCOUNTER — TRANSCRIPTION ENCOUNTER (OUTPATIENT)
Age: 84
End: 2024-06-03

## 2024-06-03 ENCOUNTER — RESULT REVIEW (OUTPATIENT)
Age: 84
End: 2024-06-03

## 2024-06-03 ENCOUNTER — APPOINTMENT (OUTPATIENT)
Dept: SURGERY | Facility: HOSPITAL | Age: 84
End: 2024-06-03
Payer: MEDICARE

## 2024-06-03 VITALS
HEIGHT: 72 IN | WEIGHT: 210.98 LBS | SYSTOLIC BLOOD PRESSURE: 136 MMHG | HEART RATE: 60 BPM | DIASTOLIC BLOOD PRESSURE: 83 MMHG | OXYGEN SATURATION: 98 % | TEMPERATURE: 98 F | RESPIRATION RATE: 16 BRPM

## 2024-06-03 DIAGNOSIS — Z98.890 OTHER SPECIFIED POSTPROCEDURAL STATES: Chronic | ICD-10-CM

## 2024-06-03 DIAGNOSIS — Z95.1 PRESENCE OF AORTOCORONARY BYPASS GRAFT: Chronic | ICD-10-CM

## 2024-06-03 DIAGNOSIS — Z85.46 PERSONAL HISTORY OF MALIGNANT NEOPLASM OF PROSTATE: Chronic | ICD-10-CM

## 2024-06-03 DIAGNOSIS — C73 MALIGNANT NEOPLASM OF THYROID GLAND: ICD-10-CM

## 2024-06-03 LAB
CALCIUM SERPL-MCNC: 9 MG/DL — SIGNIFICANT CHANGE UP (ref 8.4–10.5)
CALCIUM SERPL-MCNC: 9 MG/DL — SIGNIFICANT CHANGE UP (ref 8.4–10.5)
GLUCOSE BLDC GLUCOMTR-MCNC: 130 MG/DL — HIGH (ref 70–99)
GLUCOSE BLDC GLUCOMTR-MCNC: 157 MG/DL — HIGH (ref 70–99)
GLUCOSE BLDC GLUCOMTR-MCNC: 159 MG/DL — HIGH (ref 70–99)
GLUCOSE BLDC GLUCOMTR-MCNC: 200 MG/DL — HIGH (ref 70–99)

## 2024-06-03 PROCEDURE — 88307 TISSUE EXAM BY PATHOLOGIST: CPT | Mod: 26

## 2024-06-03 PROCEDURE — 88305 TISSUE EXAM BY PATHOLOGIST: CPT | Mod: 26

## 2024-06-03 PROCEDURE — 60252 REMOVAL OF THYROID: CPT

## 2024-06-03 PROCEDURE — 13132 CMPLX RPR F/C/C/M/N/AX/G/H/F: CPT | Mod: 59

## 2024-06-03 DEVICE — LIGATING CLIPS WECK HORIZON SMALL-WIDE (RED) 24: Type: IMPLANTABLE DEVICE | Status: FUNCTIONAL

## 2024-06-03 DEVICE — LIGATING CLIPS WECK HORIZON MEDIUM (BLUE) 24: Type: IMPLANTABLE DEVICE | Status: FUNCTIONAL

## 2024-06-03 RX ORDER — ONDANSETRON 8 MG/1
4 TABLET, FILM COATED ORAL ONCE
Refills: 0 | Status: DISCONTINUED | OUTPATIENT
Start: 2024-06-03 | End: 2024-06-04

## 2024-06-03 RX ORDER — ACETAMINOPHEN 500 MG
1000 TABLET ORAL EVERY 6 HOURS
Refills: 0 | Status: DISCONTINUED | OUTPATIENT
Start: 2024-06-03 | End: 2024-06-04

## 2024-06-03 RX ORDER — LEVOTHYROXINE SODIUM 125 MCG
1 TABLET ORAL
Qty: 30 | Refills: 2
Start: 2024-06-03 | End: 2024-08-31

## 2024-06-03 RX ORDER — DEXTROSE 50 % IN WATER 50 %
12.5 SYRINGE (ML) INTRAVENOUS ONCE
Refills: 0 | Status: DISCONTINUED | OUTPATIENT
Start: 2024-06-03 | End: 2024-06-03

## 2024-06-03 RX ORDER — INSULIN LISPRO 100/ML
VIAL (ML) SUBCUTANEOUS AT BEDTIME
Refills: 0 | Status: DISCONTINUED | OUTPATIENT
Start: 2024-06-03 | End: 2024-06-03

## 2024-06-03 RX ORDER — FENTANYL CITRATE 50 UG/ML
50 INJECTION INTRAVENOUS
Refills: 0 | Status: DISCONTINUED | OUTPATIENT
Start: 2024-06-03 | End: 2024-06-03

## 2024-06-03 RX ORDER — DEXTROSE 50 % IN WATER 50 %
25 SYRINGE (ML) INTRAVENOUS ONCE
Refills: 0 | Status: DISCONTINUED | OUTPATIENT
Start: 2024-06-03 | End: 2024-06-03

## 2024-06-03 RX ORDER — LEVOTHYROXINE SODIUM 125 MCG
100 TABLET ORAL DAILY
Refills: 0 | Status: DISCONTINUED | OUTPATIENT
Start: 2024-06-03 | End: 2024-06-04

## 2024-06-03 RX ORDER — INSULIN LISPRO 100/ML
VIAL (ML) SUBCUTANEOUS AT BEDTIME
Refills: 0 | Status: DISCONTINUED | OUTPATIENT
Start: 2024-06-03 | End: 2024-06-04

## 2024-06-03 RX ORDER — CALCIUM CARBONATE 500(1250)
1 TABLET ORAL EVERY 6 HOURS
Refills: 0 | Status: DISCONTINUED | OUTPATIENT
Start: 2024-06-03 | End: 2024-06-04

## 2024-06-03 RX ORDER — ACETAMINOPHEN 500 MG
1000 TABLET ORAL EVERY 6 HOURS
Refills: 0 | Status: DISCONTINUED | OUTPATIENT
Start: 2024-06-03 | End: 2024-06-03

## 2024-06-03 RX ORDER — METOPROLOL TARTRATE 50 MG
25 TABLET ORAL DAILY
Refills: 0 | Status: DISCONTINUED | OUTPATIENT
Start: 2024-06-03 | End: 2024-06-03

## 2024-06-03 RX ORDER — OXYCODONE HYDROCHLORIDE 5 MG/1
5 TABLET ORAL EVERY 6 HOURS
Refills: 0 | Status: DISCONTINUED | OUTPATIENT
Start: 2024-06-03 | End: 2024-06-04

## 2024-06-03 RX ORDER — METOPROLOL TARTRATE 50 MG
1 TABLET ORAL
Refills: 0 | DISCHARGE

## 2024-06-03 RX ORDER — ATORVASTATIN CALCIUM 80 MG/1
10 TABLET, FILM COATED ORAL AT BEDTIME
Refills: 0 | Status: DISCONTINUED | OUTPATIENT
Start: 2024-06-03 | End: 2024-06-04

## 2024-06-03 RX ORDER — ATORVASTATIN CALCIUM 80 MG/1
1 TABLET, FILM COATED ORAL
Refills: 0 | DISCHARGE

## 2024-06-03 RX ORDER — BENZOCAINE AND MENTHOL 5; 1 G/100ML; G/100ML
1 LIQUID ORAL
Refills: 0 | Status: DISCONTINUED | OUTPATIENT
Start: 2024-06-03 | End: 2024-06-03

## 2024-06-03 RX ORDER — BENZOCAINE AND MENTHOL 5; 1 G/100ML; G/100ML
1 LIQUID ORAL
Refills: 0 | Status: DISCONTINUED | OUTPATIENT
Start: 2024-06-03 | End: 2024-06-04

## 2024-06-03 RX ORDER — SODIUM CHLORIDE 9 MG/ML
1000 INJECTION, SOLUTION INTRAVENOUS
Refills: 0 | Status: DISCONTINUED | OUTPATIENT
Start: 2024-06-03 | End: 2024-06-04

## 2024-06-03 RX ORDER — CALCIUM GLUCONATE 100 MG/ML
1 VIAL (ML) INTRAVENOUS ONCE
Refills: 0 | Status: COMPLETED | OUTPATIENT
Start: 2024-06-03 | End: 2024-06-03

## 2024-06-03 RX ORDER — SODIUM CHLORIDE 9 MG/ML
1000 INJECTION, SOLUTION INTRAVENOUS
Refills: 0 | Status: DISCONTINUED | OUTPATIENT
Start: 2024-06-03 | End: 2024-06-03

## 2024-06-03 RX ORDER — OXYBUTYNIN CHLORIDE 5 MG
5 TABLET ORAL
Refills: 0 | Status: DISCONTINUED | OUTPATIENT
Start: 2024-06-03 | End: 2024-06-03

## 2024-06-03 RX ORDER — ACETAMINOPHEN 500 MG
2 TABLET ORAL
Qty: 0 | Refills: 0 | DISCHARGE
Start: 2024-06-03

## 2024-06-03 RX ORDER — SOLIFENACIN SUCCINATE 10 MG/1
1 TABLET ORAL
Refills: 0 | DISCHARGE

## 2024-06-03 RX ORDER — OXYCODONE HYDROCHLORIDE 5 MG/1
5 TABLET ORAL ONCE
Refills: 0 | Status: DISCONTINUED | OUTPATIENT
Start: 2024-06-03 | End: 2024-06-03

## 2024-06-03 RX ORDER — FENTANYL CITRATE 50 UG/ML
50 INJECTION INTRAVENOUS
Refills: 0 | Status: DISCONTINUED | OUTPATIENT
Start: 2024-06-03 | End: 2024-06-04

## 2024-06-03 RX ORDER — OXYBUTYNIN CHLORIDE 5 MG
5 TABLET ORAL
Refills: 0 | Status: DISCONTINUED | OUTPATIENT
Start: 2024-06-03 | End: 2024-06-04

## 2024-06-03 RX ORDER — DEXTROSE 10 % IN WATER 10 %
125 INTRAVENOUS SOLUTION INTRAVENOUS ONCE
Refills: 0 | Status: DISCONTINUED | OUTPATIENT
Start: 2024-06-03 | End: 2024-06-03

## 2024-06-03 RX ORDER — GLUCAGON INJECTION, SOLUTION 0.5 MG/.1ML
1 INJECTION, SOLUTION SUBCUTANEOUS ONCE
Refills: 0 | Status: DISCONTINUED | OUTPATIENT
Start: 2024-06-03 | End: 2024-06-03

## 2024-06-03 RX ORDER — ATORVASTATIN CALCIUM 80 MG/1
10 TABLET, FILM COATED ORAL AT BEDTIME
Refills: 0 | Status: DISCONTINUED | OUTPATIENT
Start: 2024-06-03 | End: 2024-06-03

## 2024-06-03 RX ORDER — CALCIUM CARBONATE 500(1250)
1 TABLET ORAL EVERY 6 HOURS
Refills: 0 | Status: DISCONTINUED | OUTPATIENT
Start: 2024-06-03 | End: 2024-06-03

## 2024-06-03 RX ORDER — GLUCAGON INJECTION, SOLUTION 0.5 MG/.1ML
1 INJECTION, SOLUTION SUBCUTANEOUS ONCE
Refills: 0 | Status: DISCONTINUED | OUTPATIENT
Start: 2024-06-03 | End: 2024-06-04

## 2024-06-03 RX ORDER — LEVOTHYROXINE SODIUM 125 MCG
100 TABLET ORAL DAILY
Refills: 0 | Status: DISCONTINUED | OUTPATIENT
Start: 2024-06-03 | End: 2024-06-03

## 2024-06-03 RX ORDER — INSULIN LISPRO 100/ML
VIAL (ML) SUBCUTANEOUS
Refills: 0 | Status: DISCONTINUED | OUTPATIENT
Start: 2024-06-03 | End: 2024-06-03

## 2024-06-03 RX ORDER — OXYCODONE HYDROCHLORIDE 5 MG/1
5 TABLET ORAL EVERY 6 HOURS
Refills: 0 | Status: DISCONTINUED | OUTPATIENT
Start: 2024-06-03 | End: 2024-06-03

## 2024-06-03 RX ORDER — CALCIUM CARBONATE 500(1250)
1 TABLET ORAL
Qty: 0 | Refills: 0 | DISCHARGE
Start: 2024-06-03

## 2024-06-03 RX ORDER — DEXTROSE 50 % IN WATER 50 %
15 SYRINGE (ML) INTRAVENOUS ONCE
Refills: 0 | Status: DISCONTINUED | OUTPATIENT
Start: 2024-06-03 | End: 2024-06-03

## 2024-06-03 RX ORDER — LEVOTHYROXINE SODIUM 125 MCG
1 TABLET ORAL
Qty: 0 | Refills: 0 | DISCHARGE
Start: 2024-06-03

## 2024-06-03 RX ORDER — METOPROLOL TARTRATE 50 MG
25 TABLET ORAL DAILY
Refills: 0 | Status: DISCONTINUED | OUTPATIENT
Start: 2024-06-03 | End: 2024-06-04

## 2024-06-03 RX ORDER — INSULIN LISPRO 100/ML
VIAL (ML) SUBCUTANEOUS
Refills: 0 | Status: DISCONTINUED | OUTPATIENT
Start: 2024-06-03 | End: 2024-06-04

## 2024-06-03 RX ORDER — ONDANSETRON 8 MG/1
4 TABLET, FILM COATED ORAL ONCE
Refills: 0 | Status: DISCONTINUED | OUTPATIENT
Start: 2024-06-03 | End: 2024-06-03

## 2024-06-03 RX ORDER — METFORMIN HYDROCHLORIDE 850 MG/1
1 TABLET ORAL
Refills: 0 | DISCHARGE

## 2024-06-03 RX ORDER — METFORMIN HYDROCHLORIDE 850 MG/1
500 TABLET ORAL AT BEDTIME
Refills: 0 | Status: DISCONTINUED | OUTPATIENT
Start: 2024-06-03 | End: 2024-06-03

## 2024-06-03 RX ADMIN — OXYCODONE HYDROCHLORIDE 5 MILLIGRAM(S): 5 TABLET ORAL at 15:25

## 2024-06-03 RX ADMIN — Medication 1000 MILLIGRAM(S): at 23:57

## 2024-06-03 RX ADMIN — Medication 5 MILLIGRAM(S): at 17:45

## 2024-06-03 RX ADMIN — Medication 1 TABLET(S): at 17:46

## 2024-06-03 RX ADMIN — FENTANYL CITRATE 50 MICROGRAM(S): 50 INJECTION INTRAVENOUS at 14:35

## 2024-06-03 RX ADMIN — Medication 1000 MILLIGRAM(S): at 22:18

## 2024-06-03 RX ADMIN — FENTANYL CITRATE 50 MICROGRAM(S): 50 INJECTION INTRAVENOUS at 14:25

## 2024-06-03 RX ADMIN — Medication 1 TABLET(S): at 15:16

## 2024-06-03 RX ADMIN — OXYCODONE HYDROCHLORIDE 5 MILLIGRAM(S): 5 TABLET ORAL at 14:29

## 2024-06-03 RX ADMIN — ATORVASTATIN CALCIUM 10 MILLIGRAM(S): 80 TABLET, FILM COATED ORAL at 22:18

## 2024-06-03 RX ADMIN — Medication 100 GRAM(S): at 14:06

## 2024-06-03 RX ADMIN — BENZOCAINE AND MENTHOL 1 LOZENGE: 5; 1 LIQUID ORAL at 15:17

## 2024-06-03 RX ADMIN — OXYCODONE HYDROCHLORIDE 5 MILLIGRAM(S): 5 TABLET ORAL at 22:18

## 2024-06-03 RX ADMIN — BENZOCAINE AND MENTHOL 1 LOZENGE: 5; 1 LIQUID ORAL at 22:19

## 2024-06-03 RX ADMIN — OXYCODONE HYDROCHLORIDE 5 MILLIGRAM(S): 5 TABLET ORAL at 23:58

## 2024-06-03 NOTE — CHART NOTE - NSCHARTNOTEFT_GEN_A_CORE
SURGERY POST OP CHECK    STATUS POST PROCEDURE:    SUBJECTIVE: Pt seen and examined without complaints. Pain is controlled. Reports mild throat discomfort. Denies CP/SOB/N/V.       OBJECTIVE:  Vital Signs Last 24 Hrs  T(C): 36.4 (03 Jun 2024 17:00), Max: 37 (03 Jun 2024 13:25)  T(F): 97.5 (03 Jun 2024 17:00), Max: 98.6 (03 Jun 2024 13:25)  HR: 57 (03 Jun 2024 17:00) (57 - 75)  BP: 135/74 (03 Jun 2024 17:00) (126/74 - 155/78)  BP(mean): 87 (03 Jun 2024 15:15) (80 - 96)  RR: 17 (03 Jun 2024 17:00) (15 - 20)  SpO2: 98% (03 Jun 2024 17:00) (96% - 100%)    Parameters below as of 03 Jun 2024 17:00  Patient On (Oxygen Delivery Method): room air      I&O's Summary    03 Jun 2024 07:01  -  03 Jun 2024 18:22  --------------------------------------------------------  IN: 310 mL / OUT: 16 mL / NET: 294 mL        PHYSICAL EXAM:  Gen: NAD, A&Ox3  Neck: incision soft, no evidence of hematoma, mild strikethrough   Pulm: No respiratory distress, no subcostal retractions  CV: RRR, no JVD  Abd: Soft, NT, ND, sx incision site dressings c/d/i  Drains: CAREY x 1 neck, SS  Extremities: Grossly symmetric     ASSESSMENT/PLAN: HPI:  83 yr old male with PMH of HTN, HLD, DM2, TVD bypass 2019, prostate cancer s/p radiation therapy. Now s/p total thyroidectomy with paratracheal node dissection.     Plan   ADAT   Pain control PRN  Ca checks at 3 pm > 9 ; f/u Ca check at 6 pm   LR @ 30   Drain removal possible tomorrow following Dr Moris martínez   Plan for DC tomorrow     D Team   [id number]

## 2024-06-03 NOTE — DISCHARGE NOTE PROVIDER - NSDCCPTREATMENT_GEN_ALL_CORE_FT
PRINCIPAL PROCEDURE  Procedure: Thyroidectomy with limited lymphadenectomy  Findings and Treatment:

## 2024-06-03 NOTE — BRIEF OPERATIVE NOTE - OPERATION/FINDINGS
Subplatysmal flaps were raised to expose the strap muscles. The strap muscles were split in the midline to expose the thyroid gland. The recurrent laryngeal nerves and parathyroid glands were identified and preserved on both sides. The left thyroid lobe and isthmus were mobilized and removed after ligating the superior and inferior thyroid vessels. Paratracheal lymph nodes were then excised. Attention was turned to the right. The right thyroid lobe was mobilized and removed after ligating the superior and inferior thyroid vessels, taking care to preserve the right recurrent laryngeal nerve and parathyroid glands.

## 2024-06-03 NOTE — PATIENT PROFILE ADULT - FALL HARM RISK - HARM RISK INTERVENTIONS

## 2024-06-03 NOTE — DISCHARGE NOTE PROVIDER - NSDCFUSCHEDAPPT_GEN_ALL_CORE_FT
[facility] Physician [facility]  DERM [address]  Scheduled Appointment: 06/06/2024    Og Mantilla  [facility]  DERM [address]  Scheduled Appointment: 06/11/2024    [facility]  GENSURG [address]  Scheduled Appointment: 06/13/2024    Dheeraj Toscano  [facility]  RADMED [address]  Scheduled Appointment: 07/23/2024    Yamini Wolfe  [facility]  ENDOCRIN [address] [place] [address]  Scheduled Appointment: 08/05/2024

## 2024-06-03 NOTE — DISCHARGE NOTE PROVIDER - CARE PROVIDER_API CALL
Santiago Subramanian  Surgery  [address] [place] [address] [address]  [place] [address]  Phone: [phone]  Fax: [phone]  Follow Up Time:

## 2024-06-03 NOTE — DISCHARGE NOTE PROVIDER - NSDCCPCAREPLAN_GEN_ALL_CORE_FT
PRINCIPAL DISCHARGE DIAGNOSIS  Diagnosis: Medullary thyroid carcinoma  Assessment and Plan of Treatment:

## 2024-06-03 NOTE — ASU PREOP CHECKLIST - HEIGHT IN FEET
11/16/2017  Ayaka Esparza    DIABETES HOME INSTRUCTIONS    Meal Planning: Eat regularly during the day, every 4-5 hours, first thing in the morning and a bedtime snack. Do not skip meals. Eat carbohydrates and protein at each meal.     Physical Activity: Get at least 30 minutes of exercise a day.     Diabetes Medication: Start Lantus insulin 10 units daily.  Increase dose by 1 unit every 2 days until blood sugars are consistently in target range :  .    Expectations for Insulin Adjustment:    My goal, as the diabetes educator is to help you understand the relationship of food, exercise, and insulin to your blood sugars. Diabetes is a self management disease, meaning that you are the one controlling your diabetes by your daily activities.    I will work with you for up to three months for blood sugar evaluation and insulin adjustment. We will aim for at least 60% of blood sugar readings to be within the target range. Once this goal is met you will be discharged from diabetes education.     Take blood sugar readings: 2 times a day. Before breakfast and before dinner     Record them and report them weekly to Michelle by  e-mail or phone call     Michelle’s contact information:  Phone: [phone]  Fax: [phone]  E-mail: [email or web address]     The more complete the information you give, the better I can help you.    If you report approximate numbers or only a few readings I cannot safely adjust your insulin and will not do so. I will attempt to get readings the next week. If I do not get sufficient readings for two weeks in a row you will be discharged from my care.     If I don’t receive your blood sugar readings, I will call and leave a message if necessary. It will be your responsibility to return my call. I will attempt to contact you three times. If I do not get a response from you, I will assume you are doing well and no longer need my service.  At that time you will be discharged from diabetes  education.     Call Michelle If Blood Sugar Is:  Higher than 300 mg/dL or lower than 70 mg/dL twice in a week.     Miscellaneous Get a home sharps container.    We Suggest Making An Appointment With Your  Doctor's Office (at least every 3-6 months), Eye Doctor (at least yearly), Dentist (at least every 6 months), Foot Doctor (as needed).     Diabetes can change over time and was has been working my be less effective in controlling blood sugar at a later date. If you would like to return for diabetes education or insulin adjustment please contact your doctor to have the order placed.    Thank you.  Please call me with any questions or concerns.   Mahsa Cheema, RN, CDE  Diabetes Nurse Educator        6

## 2024-06-03 NOTE — DISCHARGE NOTE PROVIDER - NSDCMRMEDTOKEN_GEN_ALL_CORE_FT
acetaminophen 500 mg oral tablet: 2 tab(s) orally every 6 hours As needed Temp greater or equal to 38.5C (101.3F), Moderate Pain (4 - 6)  atorvastatin 10 mg oral tablet: 1 tab(s) orally once a day (in the morning)  calcium carbonate 500 mg (200 mg elemental calcium) oral tablet, chewable: 1 tab(s) orally every 6 hours  levothyroxine 100 mcg (0.1 mg) oral tablet: 1 tab(s) orally once a day  metFORMIN 500 mg oral tablet: 1 tab(s) orally once a day (at bedtime)  metoprolol succinate 25 mg oral tablet, extended release: 1 tab(s) orally once a day (in the morning)  solifenacin 10 mg oral tablet: 1 tab(s) orally once a day (at bedtime)

## 2024-06-04 ENCOUNTER — TRANSCRIPTION ENCOUNTER (OUTPATIENT)
Age: 84
End: 2024-06-04

## 2024-06-04 VITALS — WEIGHT: 210.98 LBS

## 2024-06-04 LAB
A1C WITH ESTIMATED AVERAGE GLUCOSE RESULT: 6.1 % — HIGH (ref 4–5.6)
ESTIMATED AVERAGE GLUCOSE: 128 — SIGNIFICANT CHANGE UP
GLUCOSE BLDC GLUCOMTR-MCNC: 162 MG/DL — HIGH (ref 70–99)

## 2024-06-04 RX ORDER — METOPROLOL TARTRATE 50 MG
25 TABLET ORAL DAILY
Refills: 0 | Status: DISCONTINUED | OUTPATIENT
Start: 2024-06-04 | End: 2024-06-04

## 2024-06-04 RX ADMIN — Medication 1000 MILLIGRAM(S): at 07:19

## 2024-06-04 RX ADMIN — Medication 1 TABLET(S): at 00:02

## 2024-06-04 RX ADMIN — Medication 100 MICROGRAM(S): at 06:54

## 2024-06-04 RX ADMIN — Medication 1 TABLET(S): at 06:54

## 2024-06-04 RX ADMIN — Medication 5 MILLIGRAM(S): at 06:54

## 2024-06-04 RX ADMIN — Medication 1: at 08:44

## 2024-06-04 RX ADMIN — Medication 1000 MILLIGRAM(S): at 07:00

## 2024-06-04 NOTE — DISCHARGE NOTE NURSING/CASE MANAGEMENT/SOCIAL WORK - NSDCPNINST_GEN_ALL_CORE
Keep incisions clean and dry, report any redness, swelling, nonstop bleeding, and drainage on incision sites and report any fever (temperature greater than 100.4), consistent nausea, vomiting, and diarrhea. Do not rub or scrub incision sites. Take pain medications as prescribed by provider and report any pain that is unrelieved by medication. Do not drive or make important decisions when taking narcotic pain medications.

## 2024-06-04 NOTE — DIETITIAN INITIAL EVALUATION ADULT - HEIGHT FOR BMI (CENTIMETERS)
182.9 Split-Thickness Skin Graft Text: The defect edges were debeveled with a #15 scalpel blade.  Given the location of the defect, shape of the defect and the proximity to free margins a split thickness skin graft was deemed most appropriate.  Using a sterile surgical marker, the primary defect shape was transferred to the donor site. The split thickness graft was then harvested.  The skin graft was then placed in the primary defect and oriented appropriately.

## 2024-06-04 NOTE — DISCHARGE NOTE NURSING/CASE MANAGEMENT/SOCIAL WORK - PATIENT PORTAL LINK FT
You can access the FollowMyHealth Patient Portal offered by [facility] by registering at the following website: [email or web address]. By joining [facility]’s FollowMyHealth portal, you will also be able to view your health information using other applications (apps) compatible with our system.

## 2024-06-04 NOTE — DIETITIAN INITIAL EVALUATION ADULT - ORAL INTAKE PTA/DIET HISTORY
Met with patient and significant other at bedside. Reports adhering to regular diet PTA with good appetite and po intake. Not compliant with DM diet per significant other and admits to having high intakes of concentrated sweets and beverages. NKFA.

## 2024-06-04 NOTE — DIETITIAN INITIAL EVALUATION ADULT - PERTINENT MEDS FT
MEDICATIONS  (STANDING):  atorvastatin 10 milliGRAM(s) Oral at bedtime  calcium carbonate    500 mG (Tums) Chewable 1 Tablet(s) Chew every 6 hours  glucagon  Injectable 1 milliGRAM(s) IntraMuscular once  insulin lispro (ADMELOG) corrective regimen sliding scale   SubCutaneous at bedtime  insulin lispro (ADMELOG) corrective regimen sliding scale   SubCutaneous three times a day before meals  levothyroxine 100 MICROGram(s) Oral daily  metoprolol succinate ER 25 milliGRAM(s) Oral daily  oxybutynin 5 milliGRAM(s) Oral two times a day    MEDICATIONS  (PRN):  acetaminophen     Tablet .. 1000 milliGRAM(s) Oral every 6 hours PRN Temp greater or equal to 38.5C (101.3F), Moderate Pain (4 - 6)  benzocaine/menthol Lozenge 1 Lozenge Oral every 2 hours PRN Sore Throat  ondansetron Injectable 4 milliGRAM(s) IV Push once PRN Nausea and/or Vomiting  oxyCODONE    IR 5 milliGRAM(s) Oral every 6 hours PRN Severe Pain (7 - 10)

## 2024-06-04 NOTE — DIETITIAN INITIAL EVALUATION ADULT - PERTINENT LABORATORY DATA
Ca    9.0      03 Jun 2024 18:00    POCT Blood Glucose.: 162 mg/dL (06-04-24 @ 08:20)  A1C with Estimated Average Glucose Result: 6.1 % (06-04-24 @ 06:54)

## 2024-06-04 NOTE — DIETITIAN INITIAL EVALUATION ADULT - REASON FOR ADMISSION
Malignant neoplasm of thyroid gland    Per chart review, 83 year old man with [id number] adenocarcinoma of the prostate, T2DM (A1c 6.1% on 6/4), HLD, HTN, admitte for newly diagnosed medullary thyroid carcinoma. S/p  total thyroidectomy, paratracheal node dissection on 06/03/2024.

## 2024-06-04 NOTE — DIETITIAN INITIAL EVALUATION ADULT - OTHER INFO
Patient consumes good appetite and po intake. Observed 100% of breakfast completed. Patient denies any nausea/vomiting/diarrhea/constipation or difficulty chewing and swallowing. Denies any weight changes. This is consistent with [facility] weight trends 5/31/22- 95.3kg, 95.3kg 3/14/23, 94.3kg 1/25/24, 95kg 4/18/24, and now 95.7kg 6/3. Weight relatively stable. Importance of having well balanced, nutrient and protein dense foods encouraged. Strategies to increase kcal and protein intake suggested. Importance of avoiding added sugar and concentrated sweets discussed. Patient and family at bedside receptive to all information provided. Plan for d/c today.

## 2024-06-04 NOTE — DISCHARGE NOTE NURSING/CASE MANAGEMENT/SOCIAL WORK - NSDCPEFALRISK_GEN_ALL_CORE
For information on Fall & Injury Prevention, visit: [email or web address] OR  [email or web address] OR  https://www.cdc.gov/steadi/patient.html

## 2024-06-04 NOTE — PROGRESS NOTE ADULT - SUBJECTIVE AND OBJECTIVE BOX
ANESTHESIA POSTOP CHECK    83y Male POSTOP DAY 1     No COMPLAINTS    NO APPARENT ANESTHESIA COMPLICATIONS      
1 day s/p total thyroidectomy. afebrile. no collections. now taking adequate  po. calcium level stable, negative chvostek's sign. CAREY removed. discharge home on synthroid and calcium. f/u in office
No

## 2024-06-06 ENCOUNTER — APPOINTMENT (OUTPATIENT)
Dept: DERMATOLOGY | Facility: CLINIC | Age: 84
End: 2024-06-06
Payer: MEDICARE

## 2024-06-06 DIAGNOSIS — L40.9 PSORIASIS, UNSPECIFIED: ICD-10-CM

## 2024-06-06 DIAGNOSIS — Z79.899 OTHER LONG TERM (CURRENT) DRUG THERAPY: ICD-10-CM

## 2024-06-06 DIAGNOSIS — L82.1 OTHER SEBORRHEIC KERATOSIS: ICD-10-CM

## 2024-06-06 LAB — SURGICAL PATHOLOGY STUDY: SIGNIFICANT CHANGE UP

## 2024-06-06 PROCEDURE — 99213 OFFICE O/P EST LOW 20 MIN: CPT | Mod: 25

## 2024-06-06 PROCEDURE — 96372 THER/PROPH/DIAG INJ SC/IM: CPT

## 2024-06-11 ENCOUNTER — APPOINTMENT (OUTPATIENT)
Dept: SURGERY | Facility: CLINIC | Age: 84
End: 2024-06-11
Payer: MEDICARE

## 2024-06-11 ENCOUNTER — APPOINTMENT (OUTPATIENT)
Dept: DERMATOLOGY | Facility: CLINIC | Age: 84
End: 2024-06-11

## 2024-06-11 PROBLEM — C73 MALIGNANT NEOPLASM OF THYROID GLAND: Chronic | Status: ACTIVE | Noted: 2024-05-28

## 2024-06-11 PROCEDURE — 99024 POSTOP FOLLOW-UP VISIT: CPT

## 2024-06-11 PROCEDURE — 36415 COLL VENOUS BLD VENIPUNCTURE: CPT

## 2024-06-11 RX ORDER — LEVOTHYROXINE SODIUM 0.1 MG/1
100 TABLET ORAL
Qty: 30 | Refills: 0 | Status: ACTIVE | COMMUNITY
Start: 2024-06-03

## 2024-06-11 NOTE — HISTORY OF PRESENT ILLNESS
[de-identified] : Pt 1 week s/p thyroidectomy doing well without complaints feels well on current dose of synthroid

## 2024-06-11 NOTE — PHYSICAL EXAM
[de-identified] : well healed scar [Midline] : located in midline position [Normal] : orientation to person, place, and time: normal

## 2024-06-11 NOTE — ASSESSMENT
[FreeTextEntry1] : s/p Thyroidectomy path discussed with patient will discuss with Dr Yaneth garcia in office continue synthroid daily care f/u 1 month

## 2024-06-12 ENCOUNTER — NON-APPOINTMENT (OUTPATIENT)
Age: 84
End: 2024-06-12

## 2024-06-12 LAB
CALCIT SERPL-MCNC: 8.8 PG/ML
CEA SERPL-MCNC: 8.5 NG/ML
PSA SERPL-MCNC: 0.22 NG/ML

## 2024-07-16 ENCOUNTER — LABORATORY RESULT (OUTPATIENT)
Age: 84
End: 2024-07-16

## 2024-07-16 ENCOUNTER — APPOINTMENT (OUTPATIENT)
Dept: SURGERY | Facility: CLINIC | Age: 84
End: 2024-07-16
Payer: MEDICARE

## 2024-07-16 DIAGNOSIS — C73 MALIGNANT NEOPLASM OF THYROID GLAND: ICD-10-CM

## 2024-07-16 PROCEDURE — 36415 COLL VENOUS BLD VENIPUNCTURE: CPT

## 2024-07-16 PROCEDURE — 99024 POSTOP FOLLOW-UP VISIT: CPT

## 2024-07-17 ENCOUNTER — NON-APPOINTMENT (OUTPATIENT)
Age: 84
End: 2024-07-17

## 2024-07-17 LAB
CALCIT SERPL-MCNC: 18.4 PG/ML
CEA SERPL-MCNC: 4.4 NG/ML
T3 SERPL-MCNC: 76 NG/DL
T4 FREE SERPL-MCNC: 1.7 NG/DL
TSH SERPL-ACNC: 4.73 UIU/ML

## 2024-07-18 ENCOUNTER — NON-APPOINTMENT (OUTPATIENT)
Age: 84
End: 2024-07-18

## 2024-07-18 LAB
THYROGLOB AB SERPL-ACNC: <20 IU/ML
THYROGLOB SERPL-MCNC: 3.55 NG/ML

## 2024-07-22 RX ORDER — LEVOTHYROXINE SODIUM 0.12 MG/1
125 TABLET ORAL DAILY
Qty: 90 | Refills: 0 | Status: ACTIVE | COMMUNITY
Start: 2024-07-22 | End: 1900-01-01

## 2024-07-23 ENCOUNTER — APPOINTMENT (OUTPATIENT)
Dept: RADIATION ONCOLOGY | Facility: CLINIC | Age: 84
End: 2024-07-23
Payer: MEDICARE

## 2024-07-23 PROCEDURE — 99443: CPT | Mod: 93

## 2024-07-23 NOTE — REASON FOR VISIT
[Routine Follow-Up] : routine follow-up visit for [Prostate Cancer] : prostate cancer [Other: ___] : [unfilled] [Home] : at home, [unfilled] , at the time of the visit. [Medical Office: ([facility])___] : at the medical office located in  [Verbal consent obtained from patient] : the patient, [unfilled]

## 2024-07-23 NOTE — REASON FOR VISIT
[Routine Follow-Up] : routine follow-up visit for [Prostate Cancer] : prostate cancer [Other: ___] : [unfilled] [Home] : at home, [unfilled] , at the time of the visit. [Medical Office: ([facility])___] : at the medical office located in  [Verbal consent obtained from patient] : the patient, [unfilled]

## 2024-07-28 NOTE — HISTORY OF PRESENT ILLNESS
Called pt to schedule appointment no answer    [FreeTextEntry1] : 83 year old man with [id number] adenocarcinoma of the prostate, Sonya 4+3, pre treatment PSA 6.32 ng/mL. He is s/p hypofractionated radiation to 70 Gy in 28 fractions completed on 1/14/19 with Lupron (1st 10/2018). h/o thyroidectomy in 6/2024.   5/24/2024 f/u: Patient presents to clinic for reevaluation since diagnosed with biopsy proven Medullary thyroid carcinoma.  Since last seen he has had a consultation with Dr. Subramanian on 1/23/2024 for a thyroid nodule,  FNA: Left AUS Thyroseq positive RET and increased expression of calcitonin US Thyroid performed on 2/15/2024: FINDINGS: Right Lobe: 4.9 cm x 1.9 cm x 2.0 cm. Normal in size. Mildly heterogeneous in echotexture. There is 0.9 x 0.8 x 0.6 cm heterogeneous partially calcified nodule in the mid to upper pole (TIRAD-4). There is 0.9 x .7 cm spongiform benign-appearing nodule in the lower pole. (TIRAD-1). Left Lobe: 4.0 cm x 2.1 cm x 2.0 cm. Normal in size. Mildly heterogeneous echotexture. There is 1.4 x 1.3 x 1.3 cm mildly hypoechoic nodule in the midpole (TIRAD-4). There is 1.1 x 0.9 x 0.7 cm spongiform benign-appearing nodule in the lower pole. (TIRAD-1). There is 0.5 x 0.5 x 0.5 cm rim calcified nodule in the lower pole (TIRAD-4). Isthmus: 3 mm. Cervical Lymph Nodes: No suspicious adenopathy. There is 1.3 x 0.3 cm benign-appearing right cervical level 3 lymph node.  6/3/2024 -- Thyroidectomy showed Final Diagnosis  (pT1bNx) 1. Neck, "paratracheal lymph nodes", excision - Fibroadipose 2.  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 - Tiny adenomatoid nodules. 6/2024 -- PSA 0.22.  7/23/2024 F/U. h/o thyroidectomy in 6/2024. Pt completed RT for prostate cancer in 1/2019. c/o occasional throat discomfort. No SOB, throat pain, dysphagia.

## 2024-07-28 NOTE — HISTORY OF PRESENT ILLNESS
[FreeTextEntry1] : 83 year old man with [id number] adenocarcinoma of the prostate, Sonya 4+3, pre treatment PSA 6.32 ng/mL. He is s/p hypofractionated radiation to 70 Gy in 28 fractions completed on 1/14/19 with Lupron (1st 10/2018). h/o thyroidectomy in 6/2024.   5/24/2024 f/u: Patient presents to clinic for reevaluation since diagnosed with biopsy proven Medullary thyroid carcinoma.  Since last seen he has had a consultation with Dr. Subramanian on 1/23/2024 for a thyroid nodule,  FNA: Left AUS Thyroseq positive RET and increased expression of calcitonin US Thyroid performed on 2/15/2024: FINDINGS: Right Lobe: 4.9 cm x 1.9 cm x 2.0 cm. Normal in size. Mildly heterogeneous in echotexture. There is 0.9 x 0.8 x 0.6 cm heterogeneous partially calcified nodule in the mid to upper pole (TIRAD-4). There is 0.9 x .7 cm spongiform benign-appearing nodule in the lower pole. (TIRAD-1). Left Lobe: 4.0 cm x 2.1 cm x 2.0 cm. Normal in size. Mildly heterogeneous echotexture. There is 1.4 x 1.3 x 1.3 cm mildly hypoechoic nodule in the midpole (TIRAD-4). There is 1.1 x 0.9 x 0.7 cm spongiform benign-appearing nodule in the lower pole. (TIRAD-1). There is 0.5 x 0.5 x 0.5 cm rim calcified nodule in the lower pole (TIRAD-4). Isthmus: 3 mm. Cervical Lymph Nodes: No suspicious adenopathy. There is 1.3 x 0.3 cm benign-appearing right cervical level 3 lymph node.  6/3/2024 -- Thyroidectomy showed Final Diagnosis  (pT1bNx) 1. Neck, "paratracheal lymph nodes", excision - Fibroadipose 2.  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 - Tiny adenomatoid nodules. 6/2024 -- PSA 0.22.  7/23/2024 F/U. h/o thyroidectomy in 6/2024. Pt completed RT for prostate cancer in 1/2019. c/o occasional throat discomfort. No SOB, throat pain, dysphagia.

## 2024-07-31 ENCOUNTER — APPOINTMENT (OUTPATIENT)
Dept: ENDOCRINOLOGY | Facility: CLINIC | Age: 84
End: 2024-07-31
Payer: MEDICARE

## 2024-07-31 VITALS
OXYGEN SATURATION: 97 % | TEMPERATURE: 98.1 F | HEART RATE: 60 BPM | WEIGHT: 211 LBS | SYSTOLIC BLOOD PRESSURE: 125 MMHG | DIASTOLIC BLOOD PRESSURE: 73 MMHG | BODY MASS INDEX: 27.09 KG/M2

## 2024-07-31 DIAGNOSIS — C73 MALIGNANT NEOPLASM OF THYROID GLAND: ICD-10-CM

## 2024-07-31 DIAGNOSIS — E03.9 HYPOTHYROIDISM, UNSPECIFIED: ICD-10-CM

## 2024-07-31 PROCEDURE — 99214 OFFICE O/P EST MOD 30 MIN: CPT

## 2024-07-31 PROCEDURE — G2211 COMPLEX E/M VISIT ADD ON: CPT

## 2024-07-31 NOTE — HISTORY OF PRESENT ILLNESS
[FreeTextEntry1] : Since last visit, had a total thyroidectomy on 6/3/24   #History of medullary thyroid cancer and PTC Diagnosis: May 2023  Last US date: 5/2023 Right lobe: 4.7 x 1.4 x 1.6cm  Nodule #1 0.9 x 0.9 x 0.9 TR 4 nodule Nodule #2 1.0 x 0.6 x 0.8cm TR3 nodule Left lobe: 4.3 x 1.6 x 1.7cm Nodule #1 1.4 x 1.5 x 1.2 cm TR 4 nodule Nodule #2 1.1 x 0.8 x 0.9cm TR4 nodule Isthmus: 0.9cm Cervical LNs: no abnormal lymph nodes FNA in the past: Yes.  Patient initially had FNA which showed AUS with Afirma which was suspicious.  He underwent a repeat FNA which showed AUS but with thyro seq resulted with medullary thyroid cancer  Total thyroidectomy 6/2024 Final path:  Tumor Focality:   Multifocal Tumor Characteristics Tumor Site:  Left lobe Tumor Size:  Greatest Dimension (Centimeters) 1.5 cm Histologic Tumor Types and Subtypes:    Medullary thyroid carcinoma Tumor Proliferative Activity Mitotic Rate:  Less than 3 mitoses per 2mm2 Tumor Necrosis:   Not identified Angioinvasion (vascular invasion):    Not identified Lymphatic Invasion:   Not identified Extrathyroidal Extension:   Not identified Margin Status:   All margins negative for carcinoma Margin Comment:   1 mm from the circumferential margin Regional Lymph Nodes Regional Lymph Node Status:   Not applicable (no regional lymph nodes submitted or found) pTNM Classification (AJCC 8th Edition) pT Category:   pT1b T Suffix:  (m) pN Category:   pN not assigned (no nodes submitted or found) Additional Findings Additional Findings:   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 No other radiation to the neck Compressive symptoms: Denies any difficulty breathing, drinking or swallowing food  #Postsurgical hypothyroidism - patient was on LT4 100mcg daily and was increased to 125mcg daily 2 weeks ago  - Denies any symptoms of hypo or hyperthyroidism

## 2024-07-31 NOTE — ASSESSMENT
[FreeTextEntry1] : #Medullary thyroid cancer #papillary thyroid cancer  -CEA is elevated at 12.3 and calcitonin is elevated at 344 preoperatively - Patient's CEA and calcitonin now down trending however will need to be repeated to look at trend.  Plan: - Repeat labs in 1 month  - will check CEA, calcitonin  -Again, referred patient to see a  regarding RET gene testing  #Hypothyroidism - LT4 just increased 2 weeks ago to 125mcg  - too early to check repeat TFTs- will repeat in 1 month.

## 2024-08-05 ENCOUNTER — APPOINTMENT (OUTPATIENT)
Dept: ENDOCRINOLOGY | Facility: CLINIC | Age: 84
End: 2024-08-05

## 2024-09-10 ENCOUNTER — APPOINTMENT (OUTPATIENT)
Dept: DERMATOLOGY | Facility: CLINIC | Age: 84
End: 2024-09-10
Payer: MEDICARE

## 2024-09-10 DIAGNOSIS — L40.0 PSORIASIS VULGARIS: ICD-10-CM

## 2024-09-10 DIAGNOSIS — Z79.899 OTHER LONG TERM (CURRENT) DRUG THERAPY: ICD-10-CM

## 2024-09-10 PROCEDURE — 99214 OFFICE O/P EST MOD 30 MIN: CPT | Mod: 25

## 2024-09-10 PROCEDURE — 96372 THER/PROPH/DIAG INJ SC/IM: CPT

## 2024-09-10 PROCEDURE — 96401 CHEMO ANTI-NEOPL SQ/IM: CPT

## 2024-09-10 NOTE — PHYSICAL EXAM
[Alert] : alert [Oriented x 3] : ~L oriented x 3 [Well Nourished] : well nourished [Declined] : declined [FreeTextEntry3] : Focused skin exam performed  The relevant portions of the exam were performed today  AAOx3, NAD, well-appearing / pleasant Focused examination within normal limits with the exception of: Few pink scaly papules on the shins bilaterally

## 2024-09-10 NOTE — ASSESSMENT
[FreeTextEntry1] : #Psoriasis vulgaris, chronic, improving Insufficient response to MTX (Jan 2020-Dec 2020, Nov 2021-March 2022) Prior tx includes excimer, phototherapy, otezla, clobetasol no longer eligible for cosentyx.  - Started tildrakizumab on 2/08/2024. Continue  Tildrakizumab 100 mg SC administered to the right upper arm without complication. Lot # [id number], Exp 1/2027 - c/w clobetasol as an ointment (per pt preference) PRN to active lesions, SED  #High risk medication use - Feb 2024 labs- CBC, CMP, hepatitis panel, and quant all WNL.  - Prev Discussed IL-23 inhibitors in detail. We have discussed the possible adverse effects of this medication, including but not limited to common and uncommon side effects of biologic medications including injection site reactions, flu-like symptoms, infections, including URI, diarrhea, urticaria, angioedema, tuberculosis, other serious and opportunistic infections, and hypersensitivity reaction. - We prev discussed that he should inform his PMD and ensure that he is up to date on all immunizations. - Live vaccines should not be given concurrently; there are no data available concerning secondary transmission of infection by live vaccines in patients receiving therapy.  RTC 12 weeks for next dose of ilumya

## 2024-09-10 NOTE — HISTORY OF PRESENT ILLNESS
[FreeTextEntry1] : F/u psoriasis [de-identified] : 83yoM here for maintenance Ilumya injection, last dose in June 2024. Tolerating well, no side effects.  Notices mild flare on lower extremities.  Previously on Cosentyx, last dose Nov 2023, no longer eligible for free drug, which is why switched to ilumya.

## 2024-10-07 ENCOUNTER — RX RENEWAL (OUTPATIENT)
Age: 84
End: 2024-10-07

## 2024-11-12 ENCOUNTER — APPOINTMENT (OUTPATIENT)
Dept: SURGERY | Facility: CLINIC | Age: 84
End: 2024-11-12
Payer: MEDICARE

## 2024-11-12 PROCEDURE — 99214 OFFICE O/P EST MOD 30 MIN: CPT

## 2024-11-12 PROCEDURE — 36415 COLL VENOUS BLD VENIPUNCTURE: CPT

## 2024-11-12 RX ORDER — METHYLPREDNISOLONE 4 MG/1
TABLET ORAL
Refills: 0 | Status: ACTIVE | COMMUNITY

## 2024-11-13 ENCOUNTER — APPOINTMENT (OUTPATIENT)
Dept: DERMATOLOGY | Facility: CLINIC | Age: 84
End: 2024-11-13

## 2024-11-14 ENCOUNTER — NON-APPOINTMENT (OUTPATIENT)
Age: 84
End: 2024-11-14

## 2024-11-14 LAB
CALCIT SERPL-MCNC: 23.2 PG/ML
T3 SERPL-MCNC: 74 NG/DL
T4 FREE SERPL-MCNC: 1.5 NG/DL
THYROGLOB AB SERPL-ACNC: 16.5 IU/ML
THYROPEROXIDASE AB SERPL IA-ACNC: 11.3 IU/ML
TSH SERPL-ACNC: 4.63 UIU/ML

## 2024-11-15 ENCOUNTER — NON-APPOINTMENT (OUTPATIENT)
Age: 84
End: 2024-11-15

## 2024-11-15 DIAGNOSIS — C73 MALIGNANT NEOPLASM OF THYROID GLAND: ICD-10-CM

## 2024-11-15 LAB — CEA SERPL-MCNC: 3.2 NG/ML

## 2024-11-15 RX ORDER — LEVOTHYROXINE SODIUM 0.14 MG/1
137 TABLET ORAL
Qty: 90 | Refills: 0 | Status: ACTIVE | COMMUNITY
Start: 2024-11-15 | End: 1900-01-01

## 2024-11-27 ENCOUNTER — APPOINTMENT (OUTPATIENT)
Dept: ENDOCRINOLOGY | Facility: CLINIC | Age: 84
End: 2024-11-27
Payer: MEDICARE

## 2024-11-27 VITALS
BODY MASS INDEX: 27.72 KG/M2 | WEIGHT: 216 LBS | SYSTOLIC BLOOD PRESSURE: 150 MMHG | OXYGEN SATURATION: 97 % | DIASTOLIC BLOOD PRESSURE: 79 MMHG | HEIGHT: 74 IN | HEART RATE: 66 BPM

## 2024-11-27 DIAGNOSIS — C73 MALIGNANT NEOPLASM OF THYROID GLAND: ICD-10-CM

## 2024-11-27 DIAGNOSIS — N43.3 HYDROCELE, UNSPECIFIED: ICD-10-CM

## 2024-11-27 PROCEDURE — 99214 OFFICE O/P EST MOD 30 MIN: CPT

## 2024-11-27 PROCEDURE — G2211 COMPLEX E/M VISIT ADD ON: CPT

## 2024-12-10 ENCOUNTER — APPOINTMENT (OUTPATIENT)
Dept: DERMATOLOGY | Facility: CLINIC | Age: 84
End: 2024-12-10
Payer: MEDICARE

## 2024-12-10 DIAGNOSIS — L40.0 PSORIASIS VULGARIS: ICD-10-CM

## 2024-12-10 DIAGNOSIS — Z79.899 OTHER LONG TERM (CURRENT) DRUG THERAPY: ICD-10-CM

## 2024-12-10 PROCEDURE — 99214 OFFICE O/P EST MOD 30 MIN: CPT | Mod: 25

## 2024-12-10 PROCEDURE — 96401 CHEMO ANTI-NEOPL SQ/IM: CPT

## 2024-12-11 RX ORDER — TILDRAKIZUMAB-ASMN 100 MG/ML
100 INJECTION, SOLUTION SUBCUTANEOUS
Qty: 1 | Refills: 3 | Status: ACTIVE | COMMUNITY
Start: 2024-12-10

## 2024-12-16 ENCOUNTER — EMERGENCY (EMERGENCY)
Facility: HOSPITAL | Age: 84
LOS: 1 days | Discharge: HOME | End: 2024-12-16
Attending: EMERGENCY MEDICINE | Admitting: EMERGENCY MEDICINE
Payer: MEDICARE

## 2024-12-16 VITALS
TEMPERATURE: 98 F | SYSTOLIC BLOOD PRESSURE: 151 MMHG | DIASTOLIC BLOOD PRESSURE: 88 MMHG | RESPIRATION RATE: 18 BRPM | OXYGEN SATURATION: 98 % | HEART RATE: 81 BPM

## 2024-12-16 VITALS
DIASTOLIC BLOOD PRESSURE: 87 MMHG | OXYGEN SATURATION: 98 % | RESPIRATION RATE: 18 BRPM | HEART RATE: 73 BPM | TEMPERATURE: 98 F | WEIGHT: 214.95 LBS | SYSTOLIC BLOOD PRESSURE: 145 MMHG

## 2024-12-16 DIAGNOSIS — Z98.890 OTHER SPECIFIED POSTPROCEDURAL STATES: Chronic | ICD-10-CM

## 2024-12-16 DIAGNOSIS — Z85.46 PERSONAL HISTORY OF MALIGNANT NEOPLASM OF PROSTATE: Chronic | ICD-10-CM

## 2024-12-16 DIAGNOSIS — Z95.1 PRESENCE OF AORTOCORONARY BYPASS GRAFT: Chronic | ICD-10-CM

## 2024-12-16 LAB
ALBUMIN SERPL ELPH-MCNC: 4.2 G/DL — SIGNIFICANT CHANGE UP (ref 3.3–5)
ALP SERPL-CCNC: 75 U/L — SIGNIFICANT CHANGE UP (ref 40–120)
ALT FLD-CCNC: 16 U/L — SIGNIFICANT CHANGE UP (ref 4–41)
ANION GAP SERPL CALC-SCNC: 12 MMOL/L — SIGNIFICANT CHANGE UP (ref 7–14)
AST SERPL-CCNC: 22 U/L — SIGNIFICANT CHANGE UP (ref 4–40)
BASOPHILS # BLD AUTO: 0.03 K/UL — SIGNIFICANT CHANGE UP (ref 0–0.2)
BASOPHILS NFR BLD AUTO: 0.3 % — SIGNIFICANT CHANGE UP (ref 0–2)
BILIRUB SERPL-MCNC: 0.8 MG/DL — SIGNIFICANT CHANGE UP (ref 0.2–1.2)
BUN SERPL-MCNC: 21 MG/DL — SIGNIFICANT CHANGE UP (ref 7–23)
CALCIUM SERPL-MCNC: 9.8 MG/DL — SIGNIFICANT CHANGE UP (ref 8.4–10.5)
CHLORIDE SERPL-SCNC: 98 MMOL/L — SIGNIFICANT CHANGE UP (ref 98–107)
CK SERPL-CCNC: 46 U/L — SIGNIFICANT CHANGE UP (ref 30–200)
CO2 SERPL-SCNC: 25 MMOL/L — SIGNIFICANT CHANGE UP (ref 22–31)
CREAT SERPL-MCNC: 0.95 MG/DL — SIGNIFICANT CHANGE UP (ref 0.5–1.3)
EGFR: 80 ML/MIN/1.73M2 — SIGNIFICANT CHANGE UP
EOSINOPHIL # BLD AUTO: 0.19 K/UL — SIGNIFICANT CHANGE UP (ref 0–0.5)
EOSINOPHIL NFR BLD AUTO: 2 % — SIGNIFICANT CHANGE UP (ref 0–6)
GLUCOSE SERPL-MCNC: 145 MG/DL — HIGH (ref 70–99)
HCT VFR BLD CALC: 43.7 % — SIGNIFICANT CHANGE UP (ref 39–50)
HGB BLD-MCNC: 14.7 G/DL — SIGNIFICANT CHANGE UP (ref 13–17)
IANC: 7.55 K/UL — HIGH (ref 1.8–7.4)
IMM GRANULOCYTES NFR BLD AUTO: 0.6 % — SIGNIFICANT CHANGE UP (ref 0–0.9)
LYMPHOCYTES # BLD AUTO: 1.03 K/UL — SIGNIFICANT CHANGE UP (ref 1–3.3)
LYMPHOCYTES # BLD AUTO: 10.6 % — LOW (ref 13–44)
MCHC RBC-ENTMCNC: 31.6 PG — SIGNIFICANT CHANGE UP (ref 27–34)
MCHC RBC-ENTMCNC: 33.6 G/DL — SIGNIFICANT CHANGE UP (ref 32–36)
MCV RBC AUTO: 94 FL — SIGNIFICANT CHANGE UP (ref 80–100)
MONOCYTES # BLD AUTO: 0.82 K/UL — SIGNIFICANT CHANGE UP (ref 0–0.9)
MONOCYTES NFR BLD AUTO: 8.5 % — SIGNIFICANT CHANGE UP (ref 2–14)
NEUTROPHILS # BLD AUTO: 7.55 K/UL — HIGH (ref 1.8–7.4)
NEUTROPHILS NFR BLD AUTO: 78 % — HIGH (ref 43–77)
NRBC # BLD: 0 /100 WBCS — SIGNIFICANT CHANGE UP (ref 0–0)
NRBC # FLD: 0 K/UL — SIGNIFICANT CHANGE UP (ref 0–0)
PLATELET # BLD AUTO: 238 K/UL — SIGNIFICANT CHANGE UP (ref 150–400)
POTASSIUM SERPL-MCNC: 4.6 MMOL/L — SIGNIFICANT CHANGE UP (ref 3.5–5.3)
POTASSIUM SERPL-SCNC: 4.6 MMOL/L — SIGNIFICANT CHANGE UP (ref 3.5–5.3)
PROT SERPL-MCNC: 6.9 G/DL — SIGNIFICANT CHANGE UP (ref 6–8.3)
RBC # BLD: 4.65 M/UL — SIGNIFICANT CHANGE UP (ref 4.2–5.8)
RBC # FLD: 12.9 % — SIGNIFICANT CHANGE UP (ref 10.3–14.5)
SODIUM SERPL-SCNC: 135 MMOL/L — SIGNIFICANT CHANGE UP (ref 135–145)
WBC # BLD: 9.68 K/UL — SIGNIFICANT CHANGE UP (ref 3.8–10.5)
WBC # FLD AUTO: 9.68 K/UL — SIGNIFICANT CHANGE UP (ref 3.8–10.5)

## 2024-12-16 PROCEDURE — 99284 EMERGENCY DEPT VISIT MOD MDM: CPT | Mod: GC

## 2024-12-16 RX ORDER — ACETAMINOPHEN 500MG 500 MG/1
1000 TABLET, COATED ORAL ONCE
Refills: 0 | Status: COMPLETED | OUTPATIENT
Start: 2024-12-16 | End: 2024-12-16

## 2024-12-16 RX ADMIN — ACETAMINOPHEN 500MG 400 MILLIGRAM(S): 500 TABLET, COATED ORAL at 12:55

## 2024-12-16 NOTE — ED ADULT NURSE NOTE - NSFALLUNIVINTERV_ED_ALL_ED
Bed/Stretcher in lowest position, wheels locked, appropriate side rails in place/Call bell, personal items and telephone in reach/Instruct patient to call for assistance before getting out of bed/chair/stretcher/Non-slip footwear applied when patient is off stretcher/[place] to call system/Physically safe environment - no spills, clutter or unnecessary equipment/Purposeful proactive rounding/Room/bathroom lighting operational, light cord in reach

## 2024-12-16 NOTE — ED PROVIDER NOTE - PATIENT PORTAL LINK FT
You can access the FollowMyHealth Patient Portal offered by [facility] by registering at the following website: [email or web address]. By joining [facility]’s FollowMyHealth portal, you will also be able to view your health information using other applications (apps) compatible with our system.

## 2024-12-16 NOTE — ED PROVIDER NOTE - CLINICAL SUMMARY MEDICAL DECISION MAKING FREE TEXT BOX
84-year-old male, history of hypertension, psoriasis, HLD, DM, thyroidectomy, presents to ED with c/o 2 months of intermittent diffuse body aches located primarily to both legs and arms.  Reports 3 months ago received a flu vaccine and sxs started 1 month afterwards.  Leg pain worse with driving, and arm pain worse with movements.  No trauma/injury.  No increase in exercise/physical activity.  Seen by PCP, took medrol dose pack and gabapentin without relief.  Seen and evaluated by neurology who referred to pain management.  Denies fevers, chills, CP, SOB, cough, congestion, abd pain, N/V, numbness, weakness, tingling.  VSS.  Patient well appearing, in no acute distress, heart RRR, lungs clear, abd soft and non-tender, no gross motor/sensory deficits, 5/5 strength x 4.  Will assess for electrolyte derangement, rhabdo.  Plan for basic labs, CK, analgesics.

## 2024-12-16 NOTE — ED ADULT TRIAGE NOTE - CHIEF COMPLAINT QUOTE
pt c/o body pain x 1 month s/p getting a flu shot.  pt states he has been to several doctors and nothing helps.  Hx:  DM, thyroidectomy

## 2024-12-16 NOTE — ED PROVIDER NOTE - NSICDXPASTMEDICALHX_GEN_ALL_CORE_FT
PAST MEDICAL HISTORY:  Dupuytren's disease of finger right    HTN (hypertension)     Hyperlipidemia     Medullary thyroid carcinoma     Obese     Type II diabetes mellitus

## 2024-12-16 NOTE — ED PROVIDER NOTE - NSFOLLOWUPINSTRUCTIONS_ED_ALL_ED_FT
You were seen in the Emergency Department for body aches.  Your evaluation today shows your symptoms are not from any dangerous or life-threatening causes.  At home, you can take acetaminophen and/or ibuprofen as needed for pain.  We recommend you follow up with rheumatology.      1) Continue all previously prescribed medications as directed.    2) Follow up with your primary care physician - take copies of your results.    3) Return to the Emergency Department for worsening or persistent symptoms, and/or ANY NEW OR CONCERNING SYMPTOMS.

## 2024-12-16 NOTE — ED PROVIDER NOTE - ATTENDING CONTRIBUTION TO CARE
DR. HANCOCK, ATTENDING MD-  I performed a face to face bedside interview with the patient regarding history of present illness, review of symptoms and past medical history. I completed an independent physical exam.  I have discussed the patient's plan of care with the resident.   Documentation as above in the note.    83 y/o male h/o dm with c/o myalgias x1 month s/p flu shot.  Eval for lyte abn rhabdo myositis.  Obtain cbc cmp ck give pain med likely dc with rheum f/u.

## 2024-12-16 NOTE — ED PROVIDER NOTE - NSFOLLOWUPCLINICS_GEN_ALL_ED_FT
[facility]  Rheumatology  [address] [address]  Phone: [phone]  Fax:

## 2024-12-16 NOTE — ED ADULT NURSE NOTE - NSHOSCREENINGQ1_ED_ALL_ED
Note to patient: The 21st Century Cures Act requires that medical notes like this be available to patients in the interest of transparency. However, be advised this is a medical document. It is intended as peer to peer communication. It is written in medical language and may contain abbreviations or verbiage that are unfamiliar. It may appear blunt or direct. Medical documents are intended to carry relevant information, facts as evident, and the clinical opinion of the practitioner.    CHIEF COMPLAINT:    Follow up    SUBJECTIVE:  Marcell Boyer is a 86 year old male who presented for follow up chronic medical conditions.  Accompanied by Lola   HTN  On amlodipine 10mg daily  Reports compliance  No side effects  Denies any chest pain SOB dizziness or palpitations   Denies any leg swelling      GCA   Managed by rheum Tom Sultana  On actemra  Rash did improve after high dose prednisone  Prednisone decreased to 10mg as on methotrexate however rashes worsens when dose is decreased   Back on prednisone 5mg and methotrexate 8 tabs of 2.5mg weekly--trying to wean off prednisone  No headaches fevers or vision changes.   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 He had a R groin complication with R femoral AV fistula, for which manual US guided compression was done on 7/26. Repeat US 7/27 showed no evidence the R femoral AV fistula. Repeat US 8/4 showed patent right common femoral artery and vein with normal waveforms and healing right AV fistula with small residual neck-like fistulous, incomplete. connection from prior fistula.  He denies any chest pain Sob dizziness or  palpitations      PE 11/2023 dx during admission  CT scan of chest which showed small subsegmental right upper lobe pulmonary emboli on CT scan done outpt for eval extent of new diagnosis of sarcoidosis.  Seen Dr Moe.   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 Lola his daughter visits often      Bladder stone s/p Cystoscopy with laser litholapaxy of bladder stone by Dr Barcenas   No postoperative complications   Has been experiencing urinary incontinence   Has had difficulties making to the bathroom   Wears full incontinence briefs   No dysuria or hematuria   D-mannose added by Dr Barcenas     Needs clearance for deep cleaning under local anesthesia   Albeit not sure exactly what it is for  No pain or numbness  Thinks he had dental caries but not certain   Will need to touch base with [facility] as pt on eliquis        Present Treatment:  oral medication(s)  Compliant with Treatment:  Yes  Blood Pressures Outside the Clinic:  as abvoe   Other Cardiac Risk Factors:  previous history of coronary artery disease, hypertension, elevated cholesterol, and age greater than 45 in a man    Review Flowsheet  More data exists         11/19/2024   PHQ 2/9 Score   Adult PHQ 2 Score 0   Adult PHQ 2 Interpretation No further screening needed   Little interest or pleasure in activity? Not at all   Feeling down, depressed or hopeless? Not at all      Details                      Component      Latest Ref Rng 8/6/2024  8:40 AM 11/18/2024  12:05 PM   WBC      4.2  - 11.0 K/mcL  3.4 (L)    RBC      4.50 - 5.90 mil/mcL  4.23 (L)    HGB      13.0 - 17.0 g/dL  13.6    HCT      39.0 - 51.0 %  41.8    MCV      78.0 - 100.0 fl  98.8    MCH      26.0 - 34.0 pg  32.2    MCHC      32.0 - 36.5 g/dL  32.5    RDW-CV      11.0 - 15.0 %  14.6    RDW-SD      39.0 - 50.0 fL  52.8 (H)    PLT      140 - 450 K/mcL  182    NRBC      <=0 /100 WBC  0    Neutrophil      %  57    LYMPH      %  36    MONO      %  6    EOSIN      %  0    BASO      %  0    Immature Granulocytes      %  1    Absolute Neutrophil      1.8 - 7.7 K/mcL  2.0    Absolute Lymph      1.0 - 4.0 K/mcL  1.2    Absolute Mono      0.3 - 0.9 K/mcL  0.2 (L)    Absolute Eos      0.0 - 0.5 K/mcL  0.0    Absolute Baso      0.0 - 0.3 K/mcL  0.0    Absolute Immature Granulocytes      0.0 - 0.2 K/mcL  0.0    Fasting Status 12  --    Sodium      135 - 145 mmol/L 144  141    Potassium      3.4 - 5.1 mmol/L 3.6  4.4    Chloride      97 - 110 mmol/L 106  104    CO2      21 - 32 mmol/L 30  28    ANION GAP      7 - 19 mmol/L 12  13    Glucose      70 - 99 mg/dL 103 (H)  156 (H)    BUN      6 - 20 mg/dL 23 (H)  18    Creatinine      0.67 - 1.17 mg/dL 1.31 (H)  1.24 (H)    Glomerular Filtration Rate      >=60  54 (L)  57 (L)    BUN/CREATININE RATIO      7 - 25  18  15    CALCIUM      8.4 - 10.2 mg/dL 8.3 (L)  9.4    TOTAL BILIRUBIN      0.2 - 1.0 mg/dL 1.1 (H)  0.7    AST/SGOT      <=37 Units/L 17  18    ALT/SGPT      <64 Units/L 24  26    ALK PHOSPHATASE      45 - 117 Units/L 40 (L)  49    Albumin      3.4 - 5.0 g/dL 3.5 (L)  3.7    TOTAL PROTEIN      6.4 - 8.2 g/dL 6.2 (L)  6.6    TOTAL PROTEIN      6.4 - 8.2 g/dL  6.7    GLOBULIN      2.0 - 4.0 g/dL 2.7  2.9    A/G Ratio, Serum      1.0 - 2.4  1.3  1.3    M PROTEIN      <=0.0 g/dL  0.1 (H)    M PROTEIN 2      <=0.0 g/dL  0.2 (H)    Total Globulin      2.1 - 4.2 g/dL  2.3    Albumin      3.5 - 4.9 g/dL  4.4    Alpha 1      0.2 - 0.4 g/dL  0.2    Alpha 2      0.5 - 0.9 g/dL  0.5    BETA      0.7 -  1.2 g/dL  0.6 (L)    Gamma      0.7 - 1.7 g/dL  0.8    Kappa, Free      0.33 - 1.94 mg/dL  2.49 (H)    Lambda, Free      0.57 - 2.63 mg/dL  4.17 (H)    Kappa/ Lambda Ratio      0.26 - 1.65   0.60    PATHOLOGY INTERP.  Two monoclonal proteins are present in the gamma region and measure 0.1 gm/dL and 0.2 gm/dL respectively.    PATHOLOGY INTERP.  Two IgG lambda monoclonal proteins are present.    IGG      700 - 1,600 mg/dL  945    IGA      70 - 400 mg/dL  117    IGM      40 - 230 mg/dL  58    CHOLESTEROL      <=199 mg/dL 197     TRIGLYCERIDE      <=149 mg/dL 107     HDL      >=40 mg/dL 72     CALCULATED LDL      <=129 mg/dL 104     CALCULATED NON HDL      mg/dL 125     CHOL/HDL      <=4.4  2.7     GLYCOHEMOGLOBIN A1C      4.5 - 5.6 % 5.5        Legend:  (H) High  (L) Low     REVIEW OF SYSTEMS:     Constitutional:  No fevers or chills.  No fatigue.   Respiratory:  No shortness of breath.  No cough.  No wheezing.  Cardiovascular:  No chest pain.  No palpitations.  No edema.  No syncope.  Gastrointestinal:  No abdominal pain.  No nausea.  No vomiting.  No diarrhea. No constipation.  No blood in stool.     Past Medical History:   Diagnosis Date    Adrenal incidentaloma  (CMD)     Nodularity of the left adrenal gland measuring about 1.1 x 1.7 cm likely related to benign adenoma rather than neoplasm    BPH (benign prostatic hyperplasia)     CAD (coronary artery disease)     managed by Dr Fuentes    CKD (chronic kidney disease) stage 3, GFR 30-59 ml/min  (CMD)     Complex renal cyst     DDD (degenerative disc disease), cervical     Dry eye syndrome     Gallbladder polyp     appears benign    GCA (giant cell arteritis)  (CMD)     Hepatic cyst     multiple but appears benign    Hyperlipidemia     Hypertension     MGUS (monoclonal gammopathy of unknown significance)     New persistent daily headache     NSTEMI (non-ST elevated myocardial infarction)  (CMD) 06/2022    revealed a  of the mid LAD and mild disease of the  dominant left circumflex.  No intervention was performed    Pseudophakia     OU    Pulmonary embolism (CMD) 2023    RUL on eliquis    Sarcoidosis     Temporal giant cell arteritis  (CMD)     Thyroid nodule     TIA (transient ischemic attack)      Past Surgical History:   Procedure Laterality Date    Ablation premature ventricular contraction  2023    Cardiac catherization  2022    revealed a  of the mid LAD and mild disease of the dominant left circumflex.  No intervention was performed    Cataract extraction extracapsular w/ intraocular lens implantation Right 2019    Colonoscopy      due  but will be around 85--to discuss    Cystoscopy      Cystoscopy with laser litholapaxy of bladder stone    Excis pterygium      Hb temporal artery ligation or biopsy  06/15/2022    Nataly     Family History   Problem Relation Age of Onset    Heart disease Mother     Cancer, Colon Father 75     Social History     Socioeconomic History    Marital status: /Civil Union     Spouse name: Not on file    Number of children: Not on file    Years of education: Not on file    Highest education level: Not on file   Occupational History    Not on file   Tobacco Use    Smoking status: Former     Current packs/day: 0.00     Average packs/day: 0.3 packs/day for 2.0 years (0.5 ttl pk-yrs)     Types: Cigarettes     Start date:      Quit date: 1960     Years since quittin.9    Smokeless tobacco: Never   Vaping Use    Vaping status: never used   Substance and Sexual Activity    Alcohol use: No    Drug use: No    Sexual activity: Not on file   Other Topics Concern    Not on file   Social History Narrative    Not on file     Social Determinants of Health     Financial Resource Strain: Low Risk  (2023)    Financial Resource Strain     Unable to Get: None   Food Insecurity: Low Risk  (2024)    Received from [facility]    Food Insecurity     Have there been times that your food  ran out, and you didn't have money to get more?: No     Are there times that you worry that this might happen?: No   Transportation Needs: Low Risk  (9/7/2024)    Received from [facility]    Transportation Needs     Do you have trouble getting transportation to medical appointments?: No     How do you normally get to and from your appointments?: Not on file   Physical Activity: High Risk (10/8/2024)    Exercise Vital Sign     Days of Exercise per Week: 0 days     Minutes of Exercise per Session: 0 min   Stress: Not on file   Social Connections: Low Risk  (7/26/2023)    Social Connections     Social Connectivity: 5 or more times a week   Interpersonal Safety: Not on file (7/26/2023)     ALLERGIES:   Allergen Reactions    Lisinopril Nausea & Vomiting, Other (See Comments), RASH and SWELLING    Metoprolol HIVES, PRURITUS, Other (See Comments) and RASH    Omeprazole Nausea & Vomiting     Current Outpatient Medications   Medication Sig Dispense Refill    amLODIPine (NORVASC) 10 MG tablet TAKE 1 TABLET BY MOUTH DAILY 90 tablet 0    apixaBAN (ELIQUIS) 5 MG Tab Take 1 tablet by mouth every 12 hours. 60 tablet 2    tamsulosin (FLOMAX) 0.4 MG Cap Take 2 capsules by mouth daily after a meal. Nightly 180 capsule 3    finasteride (PROSCAR) 5 MG tablet TAKE 1 TABLET BY MOUTH DAILY 90 tablet 1    methotrexate 2.5 MG tablet Take 20 mg by mouth every 7 days.      predniSONE (DELTASONE) 5 MG tablet Take 1 tablet by mouth daily.      Probiotic Product (PROBIOTIC DAILY PO)       pravastatin (PRAVACHOL) 40 MG tablet Take 1 tablet by mouth at bedtime. 90 tablet 3    triamcinolone (ARISTOCORT) 0.1 % cream Apply topically 2 times daily.      famotidine (PEPCID) 40 MG tablet Take 1 tablet by mouth daily.      folic acid (FOLATE) 1 MG tablet Take 1 mg by mouth 1 time.      TURMERIC PO 1 capsule in the morning and 1 in the evening- per daughter      fluticasone (FLONASE) 50 MCG/ACT nasal spray Spray 2 sprays in each  nostril daily as needed (allergies).      hydroCORTisone (CORTIZONE) 1 % cream Apply topically 2 times daily as needed. 30 g 0    clobetasol (TEMOVATE) 0.05 % cream Apply 1 Application topically in the morning and 1 Application in the evening.      TOCILizumab (Actemra) 162 MG/0.9ML prefilled syringe for injection Inject 162 mg into the skin every 28 days.      Multiple Vitamins-Minerals (Multivitamins) Chew Tab Chew 2 tablets by mouth daily.      Glucosamine-Chondroitin 250-200 MG Cap Take 1 capsule by mouth 2 times daily.       aspirin 325 MG tablet Take 325 mg by mouth daily.       calcium carbonate (OS-KERLINE) 1250 (500 Ca) MG tablet Take 1 tablet by mouth 2 times daily.        No current facility-administered medications for this visit.       OBJECTIVE:  PHYSICAL EXAM:    Visit Vitals  /80   Pulse 80   Temp 97.9 °F (36.6 °C) (Temporal)   Resp 16   Wt 73.5 kg (162 lb)   SpO2 98%   BMI 25.37 kg/m²       Constitutional:  non toxic appearing no acute distress.   HEENT:  Normocephalic, atraumatic.  Conjunctivae pink.  Mucous membranes moist and pink.  Respiratory:  Clear to auscultation bilaterally.  Normal inspiratory effort.   Cardiovascular:  Regular rate and rhythm.  Normal S1, S2.  No S3, S4.  No murmur.  Dorsalis pedis and posterior tibial pulses intact +trace of swelling bilaterally no tenderness or erythema .  Extremities:  No pallor.  No cyanosis.    ASSESSMENT:   1. Primary hypertension  Stable  CPM  DASH diet  Monitor BP   Call if > 140/90    2. Mixed hyperlipidemia  Stable  CPM  Low fat cholesterol high fiber and regular exercise as tolerated     3. History of pulmonary embolism  On eliquis   Denies any bleeding    4. Urge incontinence of urine  Needs brief diapers   Will need to place order through insurance  Violet will call company     5. Other giant cell arteritis (CMD)  Managed by Dr Sultana   On actemra    6. Dental disorder  Will need to contact dental office for clarification     Follow up in 3  months or sooner as needed    Instructions provided as documented in the after visit summary.    The patient and Violet  indicated understanding of the diagnosis and agreed with the plan of care.   Patient Education:Patient Educated on Disease Process, Etiology, and Prognosis. , Proper Usage and Side Effects of Medication Reviewed and Discussed., Medical Compliance with Plan Discussed and Risk of Non-compliance Reviewed., and Return to Clinic as Clinically Indicated as Discussed with Patient, Who Verbalized Understanding of and Agreement with the Plan.   Electronically signed  Sandra Richardson MD  12/17/24       No

## 2024-12-16 NOTE — ED ADULT NURSE NOTE - OBJECTIVE STATEMENT
Pt received to rm 29   , awake and alert, A&OX4, ambulatory. C/o bilateral leg pain and right arm pain. pt states this pain started after he received his Flu shot to the right arm and that is when the pain started. pt denies any falls on injury. no swelling bursing or redness noted to the anywhere on the body. pt denies any reccent fever or chills, Respirations even and unlabored. Denies CP, SOB, N/V, HA, dizziness, palpitations, blurry vision.  Bed in lowest position, call bell within reach. Safety maintained.

## 2025-01-16 ENCOUNTER — APPOINTMENT (OUTPATIENT)
Dept: RHEUMATOLOGY | Facility: CLINIC | Age: 85
End: 2025-01-16
Payer: MEDICARE

## 2025-01-16 VITALS
HEART RATE: 67 BPM | HEIGHT: 72 IN | OXYGEN SATURATION: 97 % | WEIGHT: 201 LBS | TEMPERATURE: 98 F | DIASTOLIC BLOOD PRESSURE: 68 MMHG | BODY MASS INDEX: 27.22 KG/M2 | SYSTOLIC BLOOD PRESSURE: 112 MMHG

## 2025-01-16 DIAGNOSIS — M79.606 PAIN IN LEG, UNSPECIFIED: ICD-10-CM

## 2025-01-16 PROCEDURE — 99204 OFFICE O/P NEW MOD 45 MIN: CPT

## 2025-01-16 RX ORDER — METOPROLOL TARTRATE 25 MG/1
25 TABLET ORAL
Refills: 0 | Status: ACTIVE | COMMUNITY

## 2025-01-17 DIAGNOSIS — M25.669 STIFFNESS OF UNSPECIFIED KNEE, NOT ELSEWHERE CLASSIFIED: ICD-10-CM

## 2025-01-17 DIAGNOSIS — M79.603 PAIN IN ARM, UNSPECIFIED: ICD-10-CM

## 2025-01-17 DIAGNOSIS — M25.552 PAIN IN RIGHT HIP: ICD-10-CM

## 2025-01-17 DIAGNOSIS — M25.551 PAIN IN RIGHT HIP: ICD-10-CM

## 2025-01-17 LAB
CRP SERPL-MCNC: 3 MG/L
ERYTHROCYTE [SEDIMENTATION RATE] IN BLOOD BY WESTERGREN METHOD: 6 MM/HR

## 2025-01-24 ENCOUNTER — APPOINTMENT (OUTPATIENT)
Dept: RADIATION ONCOLOGY | Facility: CLINIC | Age: 85
End: 2025-01-24
Payer: MEDICARE

## 2025-01-24 PROCEDURE — 99212 OFFICE O/P EST SF 10 MIN: CPT | Mod: 93

## 2025-02-05 ENCOUNTER — APPOINTMENT (OUTPATIENT)
Dept: ULTRASOUND IMAGING | Facility: CLINIC | Age: 85
End: 2025-02-05

## 2025-02-05 ENCOUNTER — OUTPATIENT (OUTPATIENT)
Dept: OUTPATIENT SERVICES | Facility: HOSPITAL | Age: 85
LOS: 1 days | End: 2025-02-05
Payer: MEDICARE

## 2025-02-05 ENCOUNTER — APPOINTMENT (OUTPATIENT)
Dept: RADIOLOGY | Facility: CLINIC | Age: 85
End: 2025-02-05

## 2025-02-05 ENCOUNTER — RESULT REVIEW (OUTPATIENT)
Age: 85
End: 2025-02-05

## 2025-02-05 DIAGNOSIS — M25.551 PAIN IN RIGHT HIP: ICD-10-CM

## 2025-02-05 DIAGNOSIS — Z85.46 PERSONAL HISTORY OF MALIGNANT NEOPLASM OF PROSTATE: Chronic | ICD-10-CM

## 2025-02-05 DIAGNOSIS — Z95.1 PRESENCE OF AORTOCORONARY BYPASS GRAFT: Chronic | ICD-10-CM

## 2025-02-05 DIAGNOSIS — Z98.890 OTHER SPECIFIED POSTPROCEDURAL STATES: Chronic | ICD-10-CM

## 2025-02-05 PROCEDURE — 76881 US COMPL JOINT R-T W/IMG: CPT | Mod: 26,RT

## 2025-02-05 PROCEDURE — 72190 X-RAY EXAM OF PELVIS: CPT

## 2025-02-05 PROCEDURE — 73030 X-RAY EXAM OF SHOULDER: CPT | Mod: 26,50

## 2025-02-05 PROCEDURE — 73030 X-RAY EXAM OF SHOULDER: CPT

## 2025-02-05 PROCEDURE — 72190 X-RAY EXAM OF PELVIS: CPT | Mod: 26

## 2025-02-05 PROCEDURE — 76881 US COMPL JOINT R-T W/IMG: CPT

## 2025-02-10 DIAGNOSIS — M25.669 STIFFNESS OF UNSPECIFIED KNEE, NOT ELSEWHERE CLASSIFIED: ICD-10-CM

## 2025-02-10 RX ORDER — METHYLPREDNISOLONE 4 MG/1
4 TABLET ORAL
Qty: 1 | Refills: 0 | Status: ACTIVE | COMMUNITY
Start: 2025-02-10 | End: 1900-01-01

## 2025-02-11 DIAGNOSIS — C73 MALIGNANT NEOPLASM OF THYROID GLAND: ICD-10-CM

## 2025-02-11 RX ORDER — LEVOTHYROXINE SODIUM 0.14 MG/1
137 TABLET ORAL
Qty: 90 | Refills: 1 | Status: ACTIVE | COMMUNITY
Start: 2025-02-11 | End: 1900-01-01

## 2025-03-03 NOTE — VITALS
[Maximal Pain Intensity: 0/10] : 0/10 [Least Pain Intensity: 0/10] : 0/10 [80: Normal activity with effort; some signs or symptoms of disease.] : 80: Normal activity with effort; some signs or symptoms of disease.  [ECOG Performance Status: 1 - Restricted in physically strenuous activity but ambulatory and able to carry out work of a light or sedentary nature] : Performance Status: 1 - Restricted in physically strenuous activity but ambulatory and able to carry out work of a light or sedentary nature, e.g., light house work, office work home

## 2025-03-04 ENCOUNTER — RESULT CHARGE (OUTPATIENT)
Age: 85
End: 2025-03-04

## 2025-03-04 ENCOUNTER — APPOINTMENT (OUTPATIENT)
Dept: UROLOGY | Facility: CLINIC | Age: 85
End: 2025-03-04
Payer: MEDICARE

## 2025-03-04 VITALS — DIASTOLIC BLOOD PRESSURE: 82 MMHG | HEART RATE: 63 BPM | SYSTOLIC BLOOD PRESSURE: 161 MMHG

## 2025-03-04 DIAGNOSIS — N52.9 MALE ERECTILE DYSFUNCTION, UNSPECIFIED: ICD-10-CM

## 2025-03-04 DIAGNOSIS — N13.8 BENIGN PROSTATIC HYPERPLASIA WITH LOWER URINARY TRACT SYMPMS: ICD-10-CM

## 2025-03-04 DIAGNOSIS — R35.0 FREQUENCY OF MICTURITION: ICD-10-CM

## 2025-03-04 DIAGNOSIS — N40.1 BENIGN PROSTATIC HYPERPLASIA WITH LOWER URINARY TRACT SYMPMS: ICD-10-CM

## 2025-03-04 DIAGNOSIS — C61 MALIGNANT NEOPLASM OF PROSTATE: ICD-10-CM

## 2025-03-04 LAB
BILIRUB UR QL STRIP: NEGATIVE
CLARITY UR: CLEAR
COLLECTION METHOD: NORMAL
GLUCOSE UR-MCNC: NEGATIVE
HCG UR QL: 2 EU/DL
HGB UR QL STRIP.AUTO: NEGATIVE
KETONES UR-MCNC: NEGATIVE
LEUKOCYTE ESTERASE UR QL STRIP: NEGATIVE
NITRITE UR QL STRIP: NEGATIVE
PH UR STRIP: 6
PROT UR STRIP-MCNC: NEGATIVE
SP GR UR STRIP: 1.01

## 2025-03-04 PROCEDURE — G2211 COMPLEX E/M VISIT ADD ON: CPT

## 2025-03-04 PROCEDURE — 99214 OFFICE O/P EST MOD 30 MIN: CPT

## 2025-03-04 PROCEDURE — 51798 US URINE CAPACITY MEASURE: CPT

## 2025-03-06 ENCOUNTER — APPOINTMENT (OUTPATIENT)
Age: 85
End: 2025-03-06

## 2025-03-06 LAB — PSA SERPL-MCNC: 0.14 NG/ML

## 2025-03-11 ENCOUNTER — APPOINTMENT (OUTPATIENT)
Dept: DERMATOLOGY | Facility: CLINIC | Age: 85
End: 2025-03-11
Payer: MEDICARE

## 2025-03-11 ENCOUNTER — NON-APPOINTMENT (OUTPATIENT)
Age: 85
End: 2025-03-11

## 2025-03-11 DIAGNOSIS — L57.0 ACTINIC KERATOSIS: ICD-10-CM

## 2025-03-11 DIAGNOSIS — L40.9 PSORIASIS, UNSPECIFIED: ICD-10-CM

## 2025-03-11 PROCEDURE — 99214 OFFICE O/P EST MOD 30 MIN: CPT | Mod: 25

## 2025-03-11 PROCEDURE — 96401 CHEMO ANTI-NEOPL SQ/IM: CPT

## 2025-03-12 LAB
ALBUMIN SERPL ELPH-MCNC: 4.5 G/DL
ALP BLD-CCNC: 78 U/L
ALT SERPL-CCNC: 16 U/L
ANION GAP SERPL CALC-SCNC: 13 MMOL/L
AST SERPL-CCNC: 19 U/L
BASOPHILS # BLD AUTO: 0.05 K/UL
BASOPHILS NFR BLD AUTO: 0.6 %
BILIRUB SERPL-MCNC: 0.8 MG/DL
BUN SERPL-MCNC: 20 MG/DL
CALCIUM SERPL-MCNC: 9.6 MG/DL
CHLORIDE SERPL-SCNC: 104 MMOL/L
CO2 SERPL-SCNC: 23 MMOL/L
CREAT SERPL-MCNC: 0.95 MG/DL
EGFRCR SERPLBLD CKD-EPI 2021: 80 ML/MIN/1.73M2
EOSINOPHIL # BLD AUTO: 0.36 K/UL
EOSINOPHIL NFR BLD AUTO: 4.6 %
GLUCOSE SERPL-MCNC: 160 MG/DL
HCT VFR BLD CALC: 44.9 %
HGB BLD-MCNC: 15 G/DL
IMM GRANULOCYTES NFR BLD AUTO: 0.5 %
LYMPHOCYTES # BLD AUTO: 1.15 K/UL
LYMPHOCYTES NFR BLD AUTO: 14.6 %
MAN DIFF?: NORMAL
MCHC RBC-ENTMCNC: 32.7 PG
MCHC RBC-ENTMCNC: 33.4 G/DL
MCV RBC AUTO: 97.8 FL
MONOCYTES # BLD AUTO: 0.58 K/UL
MONOCYTES NFR BLD AUTO: 7.3 %
NEUTROPHILS # BLD AUTO: 5.72 K/UL
NEUTROPHILS NFR BLD AUTO: 72.4 %
PLATELET # BLD AUTO: 236 K/UL
POTASSIUM SERPL-SCNC: 4.5 MMOL/L
PROT SERPL-MCNC: 6.8 G/DL
RBC # BLD: 4.59 M/UL
RBC # FLD: 14 %
SODIUM SERPL-SCNC: 141 MMOL/L
WBC # FLD AUTO: 7.9 K/UL

## 2025-03-15 LAB
M TB IFN-G BLD-IMP: ABNORMAL
QUANTIFERON TB PLUS MITOGEN MINUS NIL: 0.45 IU/ML
QUANTIFERON TB PLUS NIL: 0.02 IU/ML
QUANTIFERON TB PLUS TB1 MINUS NIL: 0 IU/ML
QUANTIFERON TB PLUS TB2 MINUS NIL: 0 IU/ML

## 2025-05-06 ENCOUNTER — APPOINTMENT (OUTPATIENT)
Dept: UROLOGY | Facility: CLINIC | Age: 85
End: 2025-05-06
Payer: MEDICARE

## 2025-05-06 ENCOUNTER — APPOINTMENT (OUTPATIENT)
Age: 85
End: 2025-05-06

## 2025-05-06 ENCOUNTER — NON-APPOINTMENT (OUTPATIENT)
Age: 85
End: 2025-05-06

## 2025-05-06 VITALS — HEART RATE: 65 BPM | SYSTOLIC BLOOD PRESSURE: 126 MMHG | DIASTOLIC BLOOD PRESSURE: 79 MMHG

## 2025-05-06 DIAGNOSIS — N40.1 BENIGN PROSTATIC HYPERPLASIA WITH LOWER URINARY TRACT SYMPMS: ICD-10-CM

## 2025-05-06 DIAGNOSIS — C61 MALIGNANT NEOPLASM OF PROSTATE: ICD-10-CM

## 2025-05-06 DIAGNOSIS — R33.9 RETENTION OF URINE, UNSPECIFIED: ICD-10-CM

## 2025-05-06 DIAGNOSIS — N13.8 BENIGN PROSTATIC HYPERPLASIA WITH LOWER URINARY TRACT SYMPMS: ICD-10-CM

## 2025-05-06 PROCEDURE — G2211 COMPLEX E/M VISIT ADD ON: CPT

## 2025-05-06 PROCEDURE — 99214 OFFICE O/P EST MOD 30 MIN: CPT

## 2025-05-06 PROCEDURE — 51798 US URINE CAPACITY MEASURE: CPT

## 2025-05-13 ENCOUNTER — APPOINTMENT (OUTPATIENT)
Dept: SURGERY | Facility: CLINIC | Age: 85
End: 2025-05-13

## 2025-05-21 RX ORDER — LEVOTHYROXINE SODIUM 0.14 MG/1
137 TABLET ORAL
Qty: 90 | Refills: 0 | Status: ACTIVE | COMMUNITY
Start: 2025-05-21 | End: 1900-01-01

## 2025-06-10 ENCOUNTER — APPOINTMENT (OUTPATIENT)
Dept: SURGERY | Facility: CLINIC | Age: 85
End: 2025-06-10
Payer: MEDICARE

## 2025-06-10 ENCOUNTER — APPOINTMENT (OUTPATIENT)
Dept: DERMATOLOGY | Facility: CLINIC | Age: 85
End: 2025-06-10
Payer: MEDICARE

## 2025-06-10 ENCOUNTER — LABORATORY RESULT (OUTPATIENT)
Age: 85
End: 2025-06-10

## 2025-06-10 VITALS — WEIGHT: 200 LBS | HEIGHT: 72 IN | BODY MASS INDEX: 27.09 KG/M2

## 2025-06-10 PROCEDURE — 36415 COLL VENOUS BLD VENIPUNCTURE: CPT

## 2025-06-10 PROCEDURE — 31575 DIAGNOSTIC LARYNGOSCOPY: CPT

## 2025-06-10 PROCEDURE — 99214 OFFICE O/P EST MOD 30 MIN: CPT | Mod: 25

## 2025-06-10 PROCEDURE — 96401 CHEMO ANTI-NEOPL SQ/IM: CPT

## 2025-06-11 ENCOUNTER — APPOINTMENT (OUTPATIENT)
Dept: ENDOCRINOLOGY | Facility: CLINIC | Age: 85
End: 2025-06-11

## 2025-06-11 ENCOUNTER — NON-APPOINTMENT (OUTPATIENT)
Age: 85
End: 2025-06-11

## 2025-06-11 LAB
CALCIT SERPL-MCNC: 10.4 PG/ML
CEA SERPL-MCNC: 3.9 NG/ML
T3 SERPL-MCNC: 93 NG/DL
T4 FREE SERPL-MCNC: 1.8 NG/DL
THYROGLOB AB SERPL-ACNC: 18.9 IU/ML
THYROGLOB SERPL-MCNC: 2.1 NG/ML
TSH SERPL-ACNC: 3.6 UIU/ML

## 2025-06-11 RX ORDER — LEVOTHYROXINE SODIUM 0.15 MG/1
150 TABLET ORAL
Qty: 90 | Refills: 1 | Status: ACTIVE | COMMUNITY
Start: 2025-06-11 | End: 1900-01-01

## 2025-06-12 RX ORDER — LEVOTHYROXINE SODIUM 0.15 MG/1
150 TABLET ORAL
Qty: 90 | Refills: 1 | Status: ACTIVE | COMMUNITY
Start: 2025-06-12 | End: 1900-01-01

## 2025-06-17 RX ORDER — LEVOTHYROXINE SODIUM 0.15 MG/1
150 TABLET ORAL
Qty: 90 | Refills: 1 | Status: ACTIVE | COMMUNITY
Start: 2025-06-17 | End: 1900-01-01

## 2025-06-26 ENCOUNTER — APPOINTMENT (OUTPATIENT)
Dept: UROLOGY | Facility: CLINIC | Age: 85
End: 2025-06-26

## 2025-07-11 ENCOUNTER — APPOINTMENT (OUTPATIENT)
Dept: UROLOGY | Facility: CLINIC | Age: 85
End: 2025-07-11
Payer: MEDICARE

## 2025-07-11 ENCOUNTER — OUTPATIENT (OUTPATIENT)
Dept: OUTPATIENT SERVICES | Facility: HOSPITAL | Age: 85
LOS: 1 days | End: 2025-07-11
Payer: MEDICARE

## 2025-07-11 DIAGNOSIS — Z95.1 PRESENCE OF AORTOCORONARY BYPASS GRAFT: Chronic | ICD-10-CM

## 2025-07-11 DIAGNOSIS — Z98.890 OTHER SPECIFIED POSTPROCEDURAL STATES: Chronic | ICD-10-CM

## 2025-07-11 DIAGNOSIS — Z85.46 PERSONAL HISTORY OF MALIGNANT NEOPLASM OF PROSTATE: Chronic | ICD-10-CM

## 2025-07-11 DIAGNOSIS — R35.0 FREQUENCY OF MICTURITION: ICD-10-CM

## 2025-07-11 PROBLEM — N20.2 CALCULUS OF KIDNEY AND URETER: Status: RESOLVED | Noted: 2017-08-02 | Resolved: 2025-07-11

## 2025-07-11 PROCEDURE — G2211 COMPLEX E/M VISIT ADD ON: CPT

## 2025-07-11 PROCEDURE — 99213 OFFICE O/P EST LOW 20 MIN: CPT

## 2025-07-11 PROCEDURE — 76775 US EXAM ABDO BACK WALL LIM: CPT

## 2025-07-11 PROCEDURE — 76775 US EXAM ABDO BACK WALL LIM: CPT | Mod: 26

## 2025-07-14 ENCOUNTER — APPOINTMENT (OUTPATIENT)
Age: 85
End: 2025-07-14

## 2025-07-14 DIAGNOSIS — N20.2 CALCULUS OF KIDNEY WITH CALCULUS OF URETER: ICD-10-CM

## 2025-07-14 DIAGNOSIS — N40.1 BENIGN PROSTATIC HYPERPLASIA WITH LOWER URINARY TRACT SYMPTOMS: ICD-10-CM

## 2025-08-09 ENCOUNTER — APPOINTMENT (OUTPATIENT)
Dept: NUCLEAR MEDICINE | Facility: CLINIC | Age: 85
End: 2025-08-09

## 2025-08-09 PROCEDURE — 78815 PET IMAGE W/CT SKULL-THIGH: CPT | Mod: PI

## 2025-08-09 PROCEDURE — A9552: CPT

## 2025-08-13 ENCOUNTER — NON-APPOINTMENT (OUTPATIENT)
Age: 85
End: 2025-08-13

## 2025-08-13 PROBLEM — R91.1 LUNG NODULE: Status: ACTIVE | Noted: 2025-08-13

## 2025-08-21 ENCOUNTER — NON-APPOINTMENT (OUTPATIENT)
Age: 85
End: 2025-08-21

## 2025-08-21 ENCOUNTER — APPOINTMENT (OUTPATIENT)
Dept: THORACIC SURGERY | Facility: CLINIC | Age: 85
End: 2025-08-21

## 2025-08-21 VITALS
OXYGEN SATURATION: 98 % | BODY MASS INDEX: 22.79 KG/M2 | DIASTOLIC BLOOD PRESSURE: 81 MMHG | SYSTOLIC BLOOD PRESSURE: 126 MMHG | RESPIRATION RATE: 17 BRPM | HEART RATE: 66 BPM | HEIGHT: 78 IN | WEIGHT: 197 LBS

## 2025-08-21 DIAGNOSIS — Z82.3 FAMILY HISTORY OF STROKE: ICD-10-CM

## 2025-08-21 DIAGNOSIS — R91.1 SOLITARY PULMONARY NODULE: ICD-10-CM

## 2025-08-21 PROCEDURE — 99205 OFFICE O/P NEW HI 60 MIN: CPT

## 2025-08-21 RX ORDER — METFORMIN HYDROCHLORIDE 500 MG/1
500 TABLET, COATED ORAL
Refills: 0 | Status: ACTIVE | COMMUNITY

## 2025-08-26 ENCOUNTER — APPOINTMENT (OUTPATIENT)
Dept: CT IMAGING | Facility: CLINIC | Age: 85
End: 2025-08-26
Payer: MEDICARE

## 2025-08-26 PROCEDURE — 70460 CT HEAD/BRAIN W/DYE: CPT

## 2025-09-02 ENCOUNTER — OUTPATIENT (OUTPATIENT)
Dept: OUTPATIENT SERVICES | Facility: HOSPITAL | Age: 85
LOS: 1 days | End: 2025-09-02

## 2025-09-02 VITALS
HEART RATE: 69 BPM | WEIGHT: 199.96 LBS | TEMPERATURE: 98 F | DIASTOLIC BLOOD PRESSURE: 78 MMHG | OXYGEN SATURATION: 98 % | HEIGHT: 72 IN | SYSTOLIC BLOOD PRESSURE: 117 MMHG | RESPIRATION RATE: 18 BRPM

## 2025-09-02 DIAGNOSIS — Z85.46 PERSONAL HISTORY OF MALIGNANT NEOPLASM OF PROSTATE: Chronic | ICD-10-CM

## 2025-09-02 DIAGNOSIS — Z91.89 OTHER SPECIFIED PERSONAL RISK FACTORS, NOT ELSEWHERE CLASSIFIED: ICD-10-CM

## 2025-09-02 DIAGNOSIS — I25.10 ATHEROSCLEROTIC HEART DISEASE OF NATIVE CORONARY ARTERY WITHOUT ANGINA PECTORIS: ICD-10-CM

## 2025-09-02 DIAGNOSIS — R91.1 SOLITARY PULMONARY NODULE: ICD-10-CM

## 2025-09-02 DIAGNOSIS — Z98.890 OTHER SPECIFIED POSTPROCEDURAL STATES: Chronic | ICD-10-CM

## 2025-09-02 DIAGNOSIS — D75.1 SECONDARY POLYCYTHEMIA: ICD-10-CM

## 2025-09-02 DIAGNOSIS — C61 MALIGNANT NEOPLASM OF PROSTATE: Chronic | ICD-10-CM

## 2025-09-02 DIAGNOSIS — E11.9 TYPE 2 DIABETES MELLITUS WITHOUT COMPLICATIONS: ICD-10-CM

## 2025-09-02 DIAGNOSIS — Z95.0 PRESENCE OF CARDIAC PACEMAKER: Chronic | ICD-10-CM

## 2025-09-02 DIAGNOSIS — Z95.0 PRESENCE OF CARDIAC PACEMAKER: ICD-10-CM

## 2025-09-02 DIAGNOSIS — E03.9 HYPOTHYROIDISM, UNSPECIFIED: ICD-10-CM

## 2025-09-02 DIAGNOSIS — Z95.1 PRESENCE OF AORTOCORONARY BYPASS GRAFT: Chronic | ICD-10-CM

## 2025-09-02 RX ORDER — HYDROXYUREA 500 MG/1
15 CAPSULE ORAL
Refills: 0 | DISCHARGE

## 2025-09-04 ENCOUNTER — APPOINTMENT (OUTPATIENT)
Dept: THORACIC SURGERY | Facility: HOSPITAL | Age: 85
End: 2025-09-04

## 2025-09-04 ENCOUNTER — OUTPATIENT (OUTPATIENT)
Dept: OUTPATIENT SERVICES | Facility: HOSPITAL | Age: 85
LOS: 1 days | End: 2025-09-04
Payer: MEDICARE

## 2025-09-04 ENCOUNTER — TRANSCRIPTION ENCOUNTER (OUTPATIENT)
Age: 85
End: 2025-09-04

## 2025-09-04 ENCOUNTER — RESULT REVIEW (OUTPATIENT)
Age: 85
End: 2025-09-04

## 2025-09-04 VITALS
SYSTOLIC BLOOD PRESSURE: 124 MMHG | HEIGHT: 72 IN | DIASTOLIC BLOOD PRESSURE: 73 MMHG | TEMPERATURE: 98 F | WEIGHT: 199.96 LBS | HEART RATE: 65 BPM | RESPIRATION RATE: 16 BRPM | OXYGEN SATURATION: 97 %

## 2025-09-04 VITALS
SYSTOLIC BLOOD PRESSURE: 115 MMHG | HEART RATE: 63 BPM | DIASTOLIC BLOOD PRESSURE: 67 MMHG | OXYGEN SATURATION: 98 % | RESPIRATION RATE: 12 BRPM

## 2025-09-04 DIAGNOSIS — Z98.890 OTHER SPECIFIED POSTPROCEDURAL STATES: Chronic | ICD-10-CM

## 2025-09-04 DIAGNOSIS — R91.1 SOLITARY PULMONARY NODULE: ICD-10-CM

## 2025-09-04 DIAGNOSIS — Z85.46 PERSONAL HISTORY OF MALIGNANT NEOPLASM OF PROSTATE: Chronic | ICD-10-CM

## 2025-09-04 DIAGNOSIS — Z95.1 PRESENCE OF AORTOCORONARY BYPASS GRAFT: Chronic | ICD-10-CM

## 2025-09-04 DIAGNOSIS — C61 MALIGNANT NEOPLASM OF PROSTATE: Chronic | ICD-10-CM

## 2025-09-04 DIAGNOSIS — Z95.0 PRESENCE OF CARDIAC PACEMAKER: Chronic | ICD-10-CM

## 2025-09-04 LAB
GLUCOSE BLDC GLUCOMTR-MCNC: 130 MG/DL — HIGH (ref 70–99)
GRAM STN FLD: SIGNIFICANT CHANGE UP
SPECIMEN SOURCE: SIGNIFICANT CHANGE UP

## 2025-09-04 PROCEDURE — 88305 TISSUE EXAM BY PATHOLOGIST: CPT | Mod: 26

## 2025-09-04 PROCEDURE — 31645 BRNCHSC W/THER ASPIR 1ST: CPT

## 2025-09-04 PROCEDURE — 88313 SPECIAL STAINS GROUP 2: CPT | Mod: 26

## 2025-09-04 PROCEDURE — 88112 CYTOPATH CELL ENHANCE TECH: CPT | Mod: 26,59

## 2025-09-04 PROCEDURE — 88341 IMHCHEM/IMCYTCHM EA ADD ANTB: CPT | Mod: 26,59

## 2025-09-04 PROCEDURE — 88342 IMHCHEM/IMCYTCHM 1ST ANTB: CPT | Mod: 26,59

## 2025-09-04 PROCEDURE — 31624 DX BRONCHOSCOPE/LAVAGE: CPT

## 2025-09-04 PROCEDURE — 88173 CYTOPATH EVAL FNA REPORT: CPT | Mod: 26

## 2025-09-04 PROCEDURE — 31629 BRONCHOSCOPY/NEEDLE BX EACH: CPT

## 2025-09-04 PROCEDURE — 71045 X-RAY EXAM CHEST 1 VIEW: CPT | Mod: 26

## 2025-09-04 PROCEDURE — 31652 BRONCH EBUS SAMPLNG 1/2 NODE: CPT

## 2025-09-04 RX ORDER — ONDANSETRON HCL/PF 4 MG/2 ML
4 VIAL (ML) INJECTION ONCE
Refills: 0 | Status: ACTIVE | OUTPATIENT
Start: 2025-09-04 | End: 2026-08-03

## 2025-09-04 RX ORDER — HYDROXYUREA 500 MG/1
150 CAPSULE ORAL
Refills: 0 | DISCHARGE

## 2025-09-04 RX ORDER — FENTANYL CITRATE-0.9 % NACL/PF 100MCG/2ML
25 SYRINGE (ML) INTRAVENOUS
Refills: 0 | Status: DISCONTINUED | OUTPATIENT
Start: 2025-09-04 | End: 2025-09-04

## 2025-09-04 RX ORDER — TAMSULOSIN HYDROCHLORIDE 0.4 MG/1
1 CAPSULE ORAL
Refills: 0 | DISCHARGE

## 2025-09-04 RX ORDER — LEVOTHYROXINE SODIUM 300 MCG
1 TABLET ORAL
Refills: 0 | DISCHARGE

## 2025-09-04 RX ORDER — METFORMIN HYDROCHLORIDE 850 MG/1
1 TABLET ORAL
Refills: 0 | DISCHARGE

## 2025-09-04 RX ORDER — METOCLOPRAMIDE HCL 10 MG
10 TABLET ORAL ONCE
Refills: 0 | Status: ACTIVE | OUTPATIENT
Start: 2025-09-04 | End: 2026-08-03

## 2025-09-04 RX ORDER — ACETAMINOPHEN 500 MG/5ML
1000 LIQUID (ML) ORAL ONCE
Refills: 0 | Status: COMPLETED | OUTPATIENT
Start: 2025-09-04 | End: 2025-09-04

## 2025-09-04 RX ADMIN — Medication 400 MILLIGRAM(S): at 10:04

## 2025-09-04 RX ADMIN — Medication 1000 MILLIGRAM(S): at 10:15

## 2025-09-05 PROBLEM — D75.1 SECONDARY POLYCYTHEMIA: Chronic | Status: ACTIVE | Noted: 2025-09-02

## 2025-09-05 PROBLEM — C73 MALIGNANT NEOPLASM OF THYROID GLAND: Chronic | Status: ACTIVE | Noted: 2025-09-02

## 2025-09-05 PROBLEM — Z87.898 PERSONAL HISTORY OF OTHER SPECIFIED CONDITIONS: Chronic | Status: ACTIVE | Noted: 2025-09-02

## 2025-09-05 PROBLEM — L40.9 PSORIASIS, UNSPECIFIED: Chronic | Status: ACTIVE | Noted: 2025-09-02

## 2025-09-05 PROBLEM — I25.10 ATHEROSCLEROTIC HEART DISEASE OF NATIVE CORONARY ARTERY WITHOUT ANGINA PECTORIS: Chronic | Status: ACTIVE | Noted: 2025-09-02

## 2025-09-05 PROBLEM — K21.9 GASTRO-ESOPHAGEAL REFLUX DISEASE WITHOUT ESOPHAGITIS: Chronic | Status: ACTIVE | Noted: 2025-09-02

## 2025-09-05 LAB
NIGHT BLUE STAIN TISS: SIGNIFICANT CHANGE UP
SPECIMEN SOURCE: SIGNIFICANT CHANGE UP

## 2025-09-06 PROBLEM — R00.1 BRADYCARDIA, UNSPECIFIED: Chronic | Status: ACTIVE | Noted: 2025-09-02

## 2025-09-06 LAB
CULTURE RESULTS: NO GROWTH — SIGNIFICANT CHANGE UP
SPECIMEN SOURCE: SIGNIFICANT CHANGE UP

## 2025-09-09 PROBLEM — C79.9 METASTATIC ADENOCARCINOMA: Status: ACTIVE | Noted: 2025-09-09

## 2025-09-09 LAB — NON-GYNECOLOGICAL CYTOLOGY STUDY: SIGNIFICANT CHANGE UP

## 2025-09-11 ENCOUNTER — APPOINTMENT (OUTPATIENT)
Dept: THORACIC SURGERY | Facility: CLINIC | Age: 85
End: 2025-09-11
Payer: MEDICARE

## 2025-09-11 ENCOUNTER — APPOINTMENT (OUTPATIENT)
Dept: DERMATOLOGY | Facility: CLINIC | Age: 85
End: 2025-09-11
Payer: MEDICARE

## 2025-09-11 VITALS
HEART RATE: 69 BPM | WEIGHT: 200 LBS | HEIGHT: 78 IN | DIASTOLIC BLOOD PRESSURE: 78 MMHG | OXYGEN SATURATION: 97 % | SYSTOLIC BLOOD PRESSURE: 135 MMHG | BODY MASS INDEX: 23.14 KG/M2 | RESPIRATION RATE: 17 BRPM

## 2025-09-11 DIAGNOSIS — L40.0 PSORIASIS VULGARIS: ICD-10-CM

## 2025-09-11 DIAGNOSIS — L57.0 ACTINIC KERATOSIS: ICD-10-CM

## 2025-09-11 DIAGNOSIS — Z79.899 OTHER LONG TERM (CURRENT) DRUG THERAPY: ICD-10-CM

## 2025-09-11 DIAGNOSIS — C79.9 SECONDARY MALIGNANT NEOPLASM OF UNSPECIFIED SITE: ICD-10-CM

## 2025-09-11 PROCEDURE — 99213 OFFICE O/P EST LOW 20 MIN: CPT

## 2025-09-11 PROCEDURE — 99214 OFFICE O/P EST MOD 30 MIN: CPT | Mod: 25

## 2025-09-11 PROCEDURE — 17003 DESTRUCT PREMALG LES 2-14: CPT

## 2025-09-11 PROCEDURE — 17000 DESTRUCT PREMALG LESION: CPT

## 2025-09-16 LAB
M TB IFN-G BLD-IMP: ABNORMAL
QUANTIFERON TB PLUS MITOGEN MINUS NIL: 0.22 IU/ML
QUANTIFERON TB PLUS NIL: 0.02 IU/ML
QUANTIFERON TB PLUS TB1 MINUS NIL: 0 IU/ML
QUANTIFERON TB PLUS TB2 MINUS NIL: 0 IU/ML

## (undated) DEVICE — APPLICATOR Q TIP 6" WOOD STEM

## (undated) DEVICE — WARMING BLANKET LOWER ADULT

## (undated) DEVICE — LABELS BLANK W PEN

## (undated) DEVICE — SUT VICRYL 3-0 18" SH (POP-OFF)

## (undated) DEVICE — SUT CHROMIC 3-0 27" SH

## (undated) DEVICE — PACK HEAD & NECK

## (undated) DEVICE — SUT PROLENE 0 30" CT-1

## (undated) DEVICE — TUBING SUCTION NONCONDUCTIVE 6MM X 12FT

## (undated) DEVICE — SOL IRR POUR NS 0.9% 500ML

## (undated) DEVICE — DRSG CURITY GAUZE SPONGE 4 X 4" 12-PLY

## (undated) DEVICE — VENODYNE/SCD SLEEVE CALF MEDIUM

## (undated) DEVICE — DRAPE 3/4 SHEET 52X76"

## (undated) DEVICE — BIPOLAR FORCEP KIRWAN CUSHING 6" X 1MM W 12FT CORD (GREEN)

## (undated) DEVICE — GLV 7 PROTEXIS (WHITE)

## (undated) DEVICE — DRAPE TOWEL BLUE 17" X 24"

## (undated) DEVICE — SOL IRR POUR H2O 1500ML

## (undated) DEVICE — SUT SILK 2-0 18" FS

## (undated) DEVICE — PREP BETADINE KIT

## (undated) DEVICE — SUT MONOCRYL 4-0 27" PS-2 UNDYED

## (undated) DEVICE — SUT VICRYL 3-0 18" TIES UNDYED

## (undated) DEVICE — SUT VICRYL 2-0 18" TIES UNDYED

## (undated) DEVICE — TRAP SPECIMEN SPUTUM 40CC

## (undated) DEVICE — DRAIN RESERVOIR FOR JACKSON PRATT 100CC CARDINAL

## (undated) DEVICE — DRSG BENZOIN 0.6CC

## (undated) DEVICE — DURABLE MEDICAL EQUIPMENT: Type: DURABLE MEDICAL EQUIPMENT

## (undated) DEVICE — VALVE SUCTION EVIS 160/200/240

## (undated) DEVICE — VALVE BIOPSY BRONCHOVIDEOSCOPE

## (undated) DEVICE — ELCTR BOVIE PENCIL SMOKE EVACUATION

## (undated) DEVICE — SYR SLIP 10CC

## (undated) DEVICE — ADAPTER FIBEROPTIC BRONCHOSCOPE DUAL AXIS SWIVEL

## (undated) DEVICE — SAFETY PIN

## (undated) DEVICE — PROTECTOR HEEL / ELBOW FLUFFY

## (undated) DEVICE — DRAPE LAPAROTOMY TRANSVERSE

## (undated) DEVICE — CANISTER DISPOSABLE THIN WALL 3000CC

## (undated) DEVICE — LAP PAD W RING 18 X 18"

## (undated) DEVICE — ELCTR GROUNDING PAD ADULT COVIDIEN

## (undated) DEVICE — SOL IRR POUR H2O 500ML

## (undated) DEVICE — SUT VICRYL 2-0 27" SH UNDYED

## (undated) DEVICE — DRSG TELFA 3 X 8

## (undated) DEVICE — DRAIN JACKSON PRATT 7MM FLAT FULL NO TROCAR

## (undated) DEVICE — Device

## (undated) DEVICE — POSITIONER STRAP ARMBOARD VELCRO TS-30